# Patient Record
Sex: FEMALE | Race: WHITE | Employment: FULL TIME | URBAN - METROPOLITAN AREA
[De-identification: names, ages, dates, MRNs, and addresses within clinical notes are randomized per-mention and may not be internally consistent; named-entity substitution may affect disease eponyms.]

---

## 2019-10-15 ENCOUNTER — OFFICE VISIT (OUTPATIENT)
Dept: ENDOCRINOLOGY | Age: 49
End: 2019-10-15
Payer: COMMERCIAL

## 2019-10-15 VITALS
HEART RATE: 75 BPM | WEIGHT: 292.2 LBS | DIASTOLIC BLOOD PRESSURE: 84 MMHG | RESPIRATION RATE: 16 BRPM | HEIGHT: 62 IN | BODY MASS INDEX: 53.77 KG/M2 | SYSTOLIC BLOOD PRESSURE: 132 MMHG | OXYGEN SATURATION: 98 %

## 2019-10-15 DIAGNOSIS — E11.9 TYPE 2 DIABETES MELLITUS WITHOUT COMPLICATION, UNSPECIFIED WHETHER LONG TERM INSULIN USE (HCC): Primary | ICD-10-CM

## 2019-10-15 DIAGNOSIS — E55.9 VITAMIN D DEFICIENCY: ICD-10-CM

## 2019-10-15 LAB — HBA1C MFR BLD: 9.3 %

## 2019-10-15 PROCEDURE — 83036 HEMOGLOBIN GLYCOSYLATED A1C: CPT | Performed by: INTERNAL MEDICINE

## 2019-10-15 PROCEDURE — 99204 OFFICE O/P NEW MOD 45 MIN: CPT | Performed by: INTERNAL MEDICINE

## 2019-10-15 RX ORDER — MELOXICAM 15 MG/1
15 TABLET ORAL DAILY
COMMUNITY

## 2019-10-15 RX ORDER — CITALOPRAM 20 MG/1
20 TABLET ORAL DAILY
COMMUNITY

## 2019-10-15 RX ORDER — GLIPIZIDE 10 MG/1
10 TABLET ORAL
COMMUNITY
End: 2020-01-23

## 2019-10-15 RX ORDER — ROPINIROLE 0.5 MG/1
0.5 TABLET, FILM COATED ORAL 3 TIMES DAILY
COMMUNITY

## 2019-10-22 ENCOUNTER — TELEPHONE (OUTPATIENT)
Dept: ENDOCRINOLOGY | Age: 49
End: 2019-10-22

## 2019-10-22 DIAGNOSIS — E11.9 TYPE 2 DIABETES MELLITUS WITHOUT COMPLICATION, UNSPECIFIED WHETHER LONG TERM INSULIN USE (HCC): Primary | ICD-10-CM

## 2019-10-23 DIAGNOSIS — E11.9 TYPE 2 DIABETES MELLITUS WITHOUT COMPLICATION, UNSPECIFIED WHETHER LONG TERM INSULIN USE (HCC): ICD-10-CM

## 2019-10-31 DIAGNOSIS — E11.9 TYPE 2 DIABETES MELLITUS WITHOUT COMPLICATION, UNSPECIFIED WHETHER LONG TERM INSULIN USE (HCC): ICD-10-CM

## 2019-11-11 ENCOUNTER — TELEPHONE (OUTPATIENT)
Dept: ENDOCRINOLOGY | Age: 49
End: 2019-11-11

## 2019-11-11 RX ORDER — INSULIN LISPRO 100 [IU]/ML
INJECTION, SOLUTION INTRAVENOUS; SUBCUTANEOUS
Qty: 10 PEN | Refills: 5 | Status: SHIPPED | OUTPATIENT
Start: 2019-11-11 | End: 2019-12-09 | Stop reason: SDUPTHER

## 2019-12-09 RX ORDER — INSULIN LISPRO 100 [IU]/ML
INJECTION, SOLUTION INTRAVENOUS; SUBCUTANEOUS
Qty: 15 PEN | Refills: 3 | Status: SHIPPED | OUTPATIENT
Start: 2019-12-09 | End: 2020-01-23 | Stop reason: SDUPTHER

## 2019-12-21 LAB
CREATININE, URINE: 111
MICROALBUMIN/CREAT 24H UR: 12.2 MG/G{CREAT}
MICROALBUMIN/CREAT UR-RTO: 11

## 2019-12-23 LAB
AVERAGE GLUCOSE: NORMAL
BUN BLDV-MCNC: NORMAL MG/DL
CALCIUM SERPL-MCNC: 9 MG/DL
CHLORIDE BLD-SCNC: NORMAL MMOL/L
CO2: NORMAL
CREAT SERPL-MCNC: 0.59 MG/DL
GFR CALCULATED: 106
GLUCOSE BLD-MCNC: NORMAL MG/DL
HBA1C MFR BLD: 8.1 %
POTASSIUM SERPL-SCNC: 4.3 MMOL/L
SODIUM BLD-SCNC: 138 MMOL/L
VITAMIN D 25-HYDROXY: 30.3
VITAMIN D2, 25 HYDROXY: NORMAL
VITAMIN D3,25 HYDROXY: NORMAL

## 2019-12-27 DIAGNOSIS — E55.9 VITAMIN D DEFICIENCY: ICD-10-CM

## 2019-12-27 DIAGNOSIS — E11.9 TYPE 2 DIABETES MELLITUS WITHOUT COMPLICATION, UNSPECIFIED WHETHER LONG TERM INSULIN USE (HCC): ICD-10-CM

## 2020-01-23 ENCOUNTER — OFFICE VISIT (OUTPATIENT)
Dept: ENDOCRINOLOGY | Age: 50
End: 2020-01-23
Payer: COMMERCIAL

## 2020-01-23 VITALS
DIASTOLIC BLOOD PRESSURE: 82 MMHG | RESPIRATION RATE: 16 BRPM | OXYGEN SATURATION: 95 % | WEIGHT: 293 LBS | HEIGHT: 62 IN | BODY MASS INDEX: 53.92 KG/M2 | HEART RATE: 84 BPM | SYSTOLIC BLOOD PRESSURE: 134 MMHG

## 2020-01-23 PROCEDURE — 99214 OFFICE O/P EST MOD 30 MIN: CPT | Performed by: INTERNAL MEDICINE

## 2020-01-23 RX ORDER — INSULIN LISPRO 100 [IU]/ML
INJECTION, SOLUTION INTRAVENOUS; SUBCUTANEOUS
Qty: 15 PEN | Refills: 3 | Status: SHIPPED
Start: 2020-01-23 | End: 2020-05-28 | Stop reason: SDUPTHER

## 2020-01-23 NOTE — PROGRESS NOTES
name: Not on file    Number of children: Not on file    Years of education: Not on file    Highest education level: Not on file   Occupational History    Not on file   Social Needs    Financial resource strain: Not on file    Food insecurity:     Worry: Not on file     Inability: Not on file    Transportation needs:     Medical: Not on file     Non-medical: Not on file   Tobacco Use    Smoking status: Former Smoker    Smokeless tobacco: Never Used   Substance and Sexual Activity    Alcohol use: Not on file    Drug use: Not on file    Sexual activity: Not on file   Lifestyle    Physical activity:     Days per week: Not on file     Minutes per session: Not on file    Stress: Not on file   Relationships    Social connections:     Talks on phone: Not on file     Gets together: Not on file     Attends Jain service: Not on file     Active member of club or organization: Not on file     Attends meetings of clubs or organizations: Not on file     Relationship status: Not on file    Intimate partner violence:     Fear of current or ex partner: Not on file     Emotionally abused: Not on file     Physically abused: Not on file     Forced sexual activity: Not on file   Other Topics Concern    Not on file   Social History Narrative    Not on file     FAMILY HISTORY   Family History   Problem Relation Age of Onset    Diabetes Neg Hx      ALLERGIES AND DRUG REACTIONS   Allergies   Allergen Reactions    Demerol Hcl [Meperidine]        CURRENT MEDICATIONS   Current Outpatient Medications   Medication Sig Dispense Refill    insulin lispro, 1 Unit Dial, (HUMALOG KWIKPEN) 100 UNIT/ML SOPN Injection 10 units before meals + sliding scale .  MAX 50 units daily 15 pen 3    Insulin Glargine, 2 Unit Dial, (TOUJEO MAX SOLOSTAR) 300 UNIT/ML SOPN Inject 38 Units into the skin nightly 6 pen 3    Insulin Pen Needle (BD PEN NEEDLE MICRO U/F) 32G X 6 MM MISC Uses with insulin 4 times a day 250 each 5    Secukinumab (COSENTYX, 300 MG DOSE, SC) Inject into the skin once a week      meloxicam (MOBIC) 15 MG tablet Take 15 mg by mouth daily      citalopram (CELEXA) 20 MG tablet Take 20 mg by mouth daily      rOPINIRole (REQUIP) 0.5 MG tablet Take 0.5 mg by mouth 3 times daily      vitamin D (CHOLECALCIFEROL) 1000 UNIT TABS tablet Take 1,000 Units by mouth daily      IRON PO Take 45 mg by mouth daily       No current facility-administered medications for this visit. Review of Systems  Constitutional: No fever, no chills, no diaphoresis, no generalized weakness. HEENT: No blurred vision, No sore throat, no ear pain, no hair loss  Neck: denied any neck swelling, difficulty swallowing,   Cardio-pulmonary: No CP, SOB or palpitation, No orthopnea or PND. No cough or wheezing. GI: No N/V/D, no constipation, No abdominal pain, no melena or hematochezia   : Denied any dysuria, hematuria, flank pain, discharge, or incontinence. Skin: denied any rash, ulcer, Hirsute, or hyperpigmentation. MSK: denied any joint deformity, joint pain/swelling, muscle pain, or back pain. Neuro: no numbness, no tingling, no weakness, _    OBJECTIVE    /82 (Site: Left Upper Arm, Position: Sitting, Cuff Size: Medium Adult)   Pulse 84   Resp 16   Ht 5' 2\" (1.575 m)   Wt (!) 300 lb 9.6 oz (136.4 kg)   SpO2 95%   BMI 54.98 kg/m²   BP Readings from Last 4 Encounters:   01/23/20 134/82   10/15/19 132/84     Wt Readings from Last 6 Encounters:   01/23/20 (!) 300 lb 9.6 oz (136.4 kg)   10/15/19 292 lb 3.2 oz (132.5 kg)       Physical examination:  General: awake alert, oriented x3, no abnormal position or movements. Morbidly Obese   HEENT: normocephalic non-traumatic, no exophthalmos   Neck: supple, no LN enlargement, no thyromegaly, no thyroid tenderness, no JVD. Pulm: Clear equal air entry no added sounds, no wheezing or rhonchi    CVS: S1 + S2, no murmur, no heave.  Dorsalis pedis pulse palpable   Abd: soft lax, no tenderness, no participate in the care of this patient. Please do not hesitate to contact us with any additional questions. Diagnosis Orders   1. IDDM (insulin dependent diabetes mellitus) ([facility])  insulin lispro, 1 Unit Dial, (HUMALOG KWIKPEN) 100 UNIT/ML SOPN    Insulin Pen Needle (BD PEN NEEDLE MICRO U/F) 32G X 6 MM MISC   2.  Type 2 diabetes mellitus without complication, unspecified whether long term insulin use (HCC)  Insulin Glargine, 2 Unit Dial, (TOUJEO MAX SOLOSTAR) 300 UNIT/ML CHAIM Hearn MD  Endocrinologist, [facility] and Endocrinology   [address] [facility], [address] [place] [address]   Phone: [phone]  Fax: [phone]  --------------------------------------------  Electronically signed by Josse Bennett MD

## 2020-01-23 NOTE — LETTER
[address] [place] [address] Department of Endocrinology Diabetes and Metabolism   [address] [place] [address]   Phone: [phone]  Fax: [phone]      Provider: Mellisa Macdonald MD  Primary Care Physician: Arnaud Quintanilla MD   Referring Provider: No ref. provider found    Patient: Cari Prabhakar  YOB: 1970  Date of Visit: 1/23/2020      Dear Dr. Arnaud Quintanilla MD   I had the pleasure of seeing your patient Cari Prabhakar today at endocrine clinic for follow up visit and I enclosed a copy of the office visit completed today. Thank you very much for asking us to participate in the care of this very pleasant patient. Please don't hesitate to call if there are any further questions or concerns. Sincerely   Mellisa Macdonald MD  Endocrinologist, [address] [address]   Phone: [phone]  Fax: [phone]      [address] [place] [address] Department of Endocrinology Diabetes and Metabolism   [address] [place] [address]   Phone: [phone]  Fax: [phone]    Date of Service: 1/23/2020   n  Primary Care Physician: Arnaud Quintanilla MD  Provider: Mellisa Macdonald MD     Reason for the visit:  DM type 2     History of Present Illness: The history is provided by the patient. No  was used. Accuracy of the patient data is excellent. Cari Prabhakar is a very pleasant 44 y.o. female seen today for  Follow up visit     Cari Prabhakar was diagnosed with diabetes at age 37 and she is currently on Toujeo 38 units daily, Humalog 10 units with meals + ss 1:50>150, Glipizide 10 mg BID  The patient has been checking blood sugar 5-8 time a days using freestyle heleen .  I reviewed point-of-care blood glucose levels   Most recent A1c results summarized below  Lab Results   Component Value Date    LABA1C 8.1 12/23/2019    LABA1C 9.3 10/15/2019     Patient has had no hypoglycemic episodes  Intimate partner violence:     Fear of current or ex partner: Not on file     Emotionally abused: Not on file     Physically abused: Not on file     Forced sexual activity: Not on file   Other Topics Concern    Not on file   Social History Narrative    Not on file     FAMILY HISTORY   Family History   Problem Relation Age of Onset    Diabetes Neg Hx      ALLERGIES AND DRUG REACTIONS   Allergies   Allergen Reactions    Demerol Hcl [Meperidine]        CURRENT MEDICATIONS   Current Outpatient Medications   Medication Sig Dispense Refill    insulin lispro, 1 Unit Dial, (HUMALOG KWIKPEN) 100 UNIT/ML SOPN Injection 10 units before meals + sliding scale . MAX 50 units daily 15 pen 3    Insulin Glargine, 2 Unit Dial, (TOUJEO MAX SOLOSTAR) 300 UNIT/ML SOPN Inject 38 Units into the skin nightly 6 pen 3    Insulin Pen Needle (BD PEN NEEDLE MICRO U/F) 32G X 6 MM MISC Uses with insulin 4 times a day 250 each 5    Secukinumab (COSENTYX, 300 MG DOSE, SC) Inject into the skin once a week      meloxicam (MOBIC) 15 MG tablet Take 15 mg by mouth daily      citalopram (CELEXA) 20 MG tablet Take 20 mg by mouth daily      rOPINIRole (REQUIP) 0.5 MG tablet Take 0.5 mg by mouth 3 times daily      vitamin D (CHOLECALCIFEROL) 1000 UNIT TABS tablet Take 1,000 Units by mouth daily      IRON PO Take 45 mg by mouth daily       No current facility-administered medications for this visit. Review of Systems  Constitutional: No fever, no chills, no diaphoresis, no generalized weakness. HEENT: No blurred vision, No sore throat, no ear pain, no hair loss  Neck: denied any neck swelling, difficulty swallowing,   Cardio-pulmonary: No CP, SOB or palpitation, No orthopnea or PND. No cough or wheezing. GI: No N/V/D, no constipation, No abdominal pain, no melena or hematochezia   : Denied any dysuria, hematuria, flank pain, discharge, or incontinence. Skin: denied any rash, ulcer, Hirsute, or hyperpigmentation. MSK: denied any joint deformity, joint pain/swelling, muscle pain, or back pain. Neuro: no numbness, no tingling, no weakness, _    OBJECTIVE    /82 (Site: Left Upper Arm, Position: Sitting, Cuff Size: Medium Adult)   Pulse 84   Resp 16   Ht 5' 2\" (1.575 m)   Wt (!) 300 lb 9.6 oz (136.4 kg)   SpO2 95%   BMI 54.98 kg/m²    BP Readings from Last 4 Encounters:   01/23/20 134/82   10/15/19 132/84     Wt Readings from Last 6 Encounters:   01/23/20 (!) 300 lb 9.6 oz (136.4 kg)   10/15/19 292 lb 3.2 oz (132.5 kg)       Physical examination:  General: awake alert, oriented x3, no abnormal position or movements. Morbidly Obese   HEENT: normocephalic non-traumatic, no exophthalmos   Neck: supple, no LN enlargement, no thyromegaly, no thyroid tenderness, no JVD. Pulm: Clear equal air entry no added sounds, no wheezing or rhonchi    CVS: S1 + S2, no murmur, no heave. Dorsalis pedis pulse palpable   Abd: soft lax, no tenderness, no organomegaly, audible bowel sounds. Skin: warm, no lesions, no rash.  No callus, no Ulcers, No acanthosis nigricans  Musculoskeletal: No back tenderness, no kyphosis/scoliosis    Neuro: CN intact, Monofilament sensation decreased bilateral , muscle power normal  Psych: normal mood, and affect      Review of Laboratory Data:  I personally reviewed the following lab:  COMP METABOLIC PANEL (90/93/8486 12:02 PM EST)  Component Value   Glucose 182 (H)   Urea Nitrogen 10   Creatinine 0.59   BUN/CREATININE RATIO 17   Total Bilirubin 0.7   Total Protein 7.9   Albumin 3.3 (L)   A/G RATIO 0.7   Calcium(Ca) 9.0   Alkaline Phosphatase 167 (H)   Aspartate Aminot.(AST) 25   Alanine Aminotrans(ALT) 35   Sodium(Na) 138   Potassium(K) 4.3   Chloride(Cl) 107   Carbon Dioxide(CO2) 27.0   ANION GAP 8.3   CALCULATED OSMOLALITY 290   EGFR NON  106     CBC AND DIFF W/ PLATELETS (59/09/6576 8:45 AM EDT)  Component Value   WBC 4.4 (L)   RBC 4.18   Hgb 11.9 (L)   Hematocrit(HCT) 34.9 (L) · Discussed with patient A1c and blood sugar goals   · Optimal blood sugars: 100-140 pre-prandial, < 180 peak post-prandial  · The patient counseled about the complications of uncontrolled diabetes   · Patient was counselled about the importance of self-blood glucose monitoring and eating consistent carb diet to avoid blood sugar fluctuations   · Patient up to date with the routine diabetes maintenance and prevention  · Diabetes labs before next visit     Obesity  ? Discussed lifestyle changes including diet and exercise with patient in depth. Also discussed with patient cardiovascular risk associated with obesity  ? Started on GLP-1 agonist     Liver cirrhosis   · Pts with liver cirrhosis are at higher risk of hypoglycemia   · Will stop Glipizide     Return in about 4 months (around 5/23/2020) for DM type 2 on insulin . The above issues were reviewed with the patient who understood and agreed with the plan. Thank you for allowing us to participate in the care of this patient. Please do not hesitate to contact us with any additional questions. Diagnosis Orders   1. IDDM (insulin dependent diabetes mellitus) (HCC)  insulin lispro, 1 Unit Dial, (HUMALOG KWIKPEN) 100 UNIT/ML SOPN    Insulin Pen Needle (BD PEN NEEDLE MICRO U/F) 32G X 6 MM MISC   2.  Type 2 diabetes mellitus without complication, unspecified whether long term insulin use (HCC)  Insulin Glargine, 2 Unit Dial, (TOUJEO MAX SOLOSTAR) 300 UNIT/ML CHAIM Barahona MD  Endocrinologist, [facility] Diabetes Care and Endocrinology   [address] [facility] [address]   Phone: [phone]  Fax: [phone]  --------------------------------------------  Electronically signed by Solitario Babb MD

## 2020-05-28 ENCOUNTER — VIRTUAL VISIT (OUTPATIENT)
Dept: ENDOCRINOLOGY | Age: 50
End: 2020-05-28
Payer: COMMERCIAL

## 2020-05-28 PROBLEM — E55.9 VITAMIN D DEFICIENCY: Status: ACTIVE | Noted: 2020-05-28

## 2020-05-28 PROCEDURE — 99214 OFFICE O/P EST MOD 30 MIN: CPT | Performed by: INTERNAL MEDICINE

## 2020-05-28 RX ORDER — INSULIN LISPRO 100 [IU]/ML
INJECTION, SOLUTION INTRAVENOUS; SUBCUTANEOUS
Qty: 25 PEN | Refills: 3 | Status: SHIPPED
Start: 2020-05-28 | End: 2020-08-25 | Stop reason: SDUPTHER

## 2020-05-28 RX ORDER — INSULIN GLARGINE 300 U/ML
45 INJECTION, SOLUTION SUBCUTANEOUS NIGHTLY
Qty: 10 PEN | Refills: 3 | Status: SHIPPED
Start: 2020-05-28 | End: 2020-08-25 | Stop reason: SDUPTHER

## 2020-05-28 RX ORDER — SEMAGLUTIDE 1.34 MG/ML
0.5 INJECTION, SOLUTION SUBCUTANEOUS
Qty: 12 PEN | Refills: 3 | Status: SHIPPED
Start: 2020-05-28 | End: 2021-06-01

## 2020-05-28 NOTE — PROGRESS NOTES
[address] [place] [address] Department of Endocrinology Diabetes and Metabolism   [address] [facility] [address]   Phone: [phone]  Fax: [phone]    Date of Service: 5/28/2020   n  Primary Care Physician: Sunday Bill MD  Provider: Maria Eugenia Nielsen MD     Reason for the visit:  DM type 2     History of Present Illness: The history is provided by the patient. No  was used. Accuracy of the patient data is excellent. Ravin Diaz is a very pleasant 44 y.o. female with past medical history of liver cirrhosis seen today for follow up visit     Ravin Diaz was diagnosed with diabetes at age 37 and she is currently on Toujeo 38 units daily at bedtime, Humalog 10 units with meals + ss 1:50>150, Ozempic 0.5 mg weekly  The patient has been checking blood sugar 5-8 time a days using freestyle helene and most readings at high   Most recent A1c results summarized below  Lab Results   Component Value Date    LABA1C 8.1 12/23/2019    LABA1C 9.3 10/15/2019     Patient has had no hypoglycemic episodes   The patient has been mindful of what has been eating and following diabetic diet as encouraged  I reviewed current medications and the patient has no issues with diabetes medications  Ravin Diaz is up to date with eye exam and denied any history of diabetic retinopathy   The patient seeing podiatrist every few weeks and also performs  own feet care  Microvascular complications:  No Retinopathy, Nephropathy or Neuropathy   Macrovascular complications: no CAD, PVD, or Stroke  The patient receives Flushot every year and up to date with the Pneumonia vaccine     PAST MEDICAL HISTORY   Past Medical History:   Diagnosis Date    DM type 2 (diabetes mellitus, type 2) ([facility]     Liver cirrhosis ([facility]     Morbid obesity ([facility]     Psoriasis     Vitamin D deficiency      PAST SURGICAL HISTORY   No past surgical history on file. SOCIAL HISTORY   Tobacco:   reports that she has quit smoking.  She joint deformity, joint pain/swelling, muscle pain, or back pain. Neuro: no numbness, no tingling, no weakness, _    OBJECTIVE    There were no vitals taken for this visit. BP Readings from Last 4 Encounters:   01/23/20 134/82   10/15/19 132/84     Wt Readings from Last 6 Encounters:   01/23/20 (!) 300 lb 9.6 oz (136.4 kg)   10/15/19 292 lb 3.2 oz (132.5 kg)     Physical examination:  Due to this being a TeleHealth encounter, evaluation of the following organ systems is limited: Vitals/Constitutional/EENT/Resp/CV/GI//MS/Neuro/Skin/Heme-Lymph-Imm. Modified physical exam through Telemedicine camera    General: Communicating well via camera   Neck: no obvious neck mass. No obvious neck deformity     CVS: no distress   Chest: no distress. Chest is moving with respiration    Extremities:  no visible tremor  Skin: No visible rashes as seen from camera   Musculoskeletal: no visible deformity  Neuro: Alert and oriented to person, place, and time. Psychiatric: Normal mood and affect.  Behavior is normal      Review of Laboratory Data:  I personally reviewed the following lab:  COMP METABOLIC PANEL (71/94/3423 12:02 PM EST)  Component Value   Glucose 182 (H)   Urea Nitrogen 10   Creatinine 0.59   BUN/CREATININE RATIO 17   Total Bilirubin 0.7   Total Protein 7.9   Albumin 3.3 (L)   A/G RATIO 0.7   Calcium(Ca) 9.0   Alkaline Phosphatase 167 (H)   Aspartate Aminot.(AST) 25   Alanine Aminotrans(ALT) 35   Sodium(Na) 138   Potassium(K) 4.3   Chloride(Cl) 107   Carbon Dioxide(CO2) 27.0   ANION GAP 8.3   CALCULATED OSMOLALITY 290   EGFR NON  106     CBC AND DIFF W/ PLATELETS (94/67/4850 8:45 AM EDT)  Component Value   WBC 4.4 (L)   RBC 4.18   Hgb 11.9 (L)   Hematocrit(HCT) 34.9 (L)   MCV 83.4   MCH 28.4 (L)   MCHC 34.1   RDW 17.2 (H)   Mean Platelet Volume 23.7 (H)   Platelets 64 (L)   Diff Type Automated Differential   Neutrophils 74.4   Lymphocytes 16.1   Monocytes 7.5   Eosinophils 1.7   Basophils 0.3 ABS Neutrophils 3.3   ABS Lymphocytes 0.7 (L)   ABS Monocytes 0.3   ABS Eosinophils 0.1   ABS Basophils 0.0       PROTHROMBIN TIME/INR (09/20/2019 8:45 AM EDT)  Component Value   Prothrombin Time 14.3   INR 1.1 (L)     No results found for: WBC, RBC, HGB, HCT, MCV, MCH, MCHC, RDW, PLT, MPV, GRANULOCYTES, BANDS   Lab Results   Component Value Date/Time     12/21/2019    K 4.3 12/21/2019    CREATININE 0.59 12/21/2019    CALCIUM 9.0 12/21/2019    LABGLOM 106 12/21/2019      No results found for: TSH, T4FREE, Q3KDSGX, FT3, S6UJIZQ, TSI, TPOABS, THGAB  Lab Results   Component Value Date    LABA1C 8.1 12/23/2019    MALBCR 11.0 12/21/2019    LABCREA 111.00 12/21/2019     Lab Results   Component Value Date    LABA1C 8.1 12/23/2019    LABA1C 9.3 10/15/2019     No results found for: CHOL, TRIG, HDL  Lab Results   Component Value Date    VITD25 30.3 12/23/2019       Medical Records/Labs/Images review:   I personally reviewed and summarized previous records   All labs and imaging studies were independently reviewed     ASSESSMENT & RECOMMENDATIONS   Ren Rehman, a 44 y.o.-old female seen in for the following issues     Diabetes Mellitus Type 2     · Worsening control due to diet non-compliance   · Pt has liver cirrhosis and she is at higher risk of hypoglycemia. Off Glipizide   · Change DM regimen to: Toujeo 45 units daily at bedtime, Humalog 15 units with meals + ss 3:50>150, Ozempic 0.5 mg weekly  · I discussed side effect profile of GLP-1 agonists with patient  · The patient was advised to checking BS using freestyle Emily scanner   · Optimal blood sugars: 100-140 pre-prandial, < 180 peak post-prandial  · The patient counseled about the complications of uncontrolled diabetes   · Patient up to date with the routine diabetes maintenance and prevention  · Diabetes labs before next visit     Obesity   Discussed lifestyle changes including diet and exercise with patient in depth.  Also discussed with patient cardiovascular

## 2020-05-28 NOTE — PROGRESS NOTES
Chino Christopher was read the following message We want to confirm that, for purposes of billing, this is a virtual visit with your provider for which we will submit a claim for reimbursement with your insurance company. You will be responsible for any copays, coinsurance amounts or other amounts not covered by your insurance company. If you do not accept this, unfortunately we will not be able to schedule a virtual visit with the provider. Do you accept?  Justyna Clay responded YES

## 2020-08-25 ENCOUNTER — VIRTUAL VISIT (OUTPATIENT)
Dept: ENDOCRINOLOGY | Age: 50
End: 2020-08-25
Payer: COMMERCIAL

## 2020-08-25 PROCEDURE — 99214 OFFICE O/P EST MOD 30 MIN: CPT | Performed by: INTERNAL MEDICINE

## 2020-08-25 RX ORDER — INSULIN GLARGINE 300 U/ML
55 INJECTION, SOLUTION SUBCUTANEOUS NIGHTLY
Qty: 10 PEN | Refills: 5 | Status: SHIPPED
Start: 2020-08-25 | End: 2021-12-10

## 2020-08-25 RX ORDER — INSULIN LISPRO 100 [IU]/ML
INJECTION, SOLUTION INTRAVENOUS; SUBCUTANEOUS
Qty: 25 PEN | Refills: 3 | Status: SHIPPED
Start: 2020-08-25 | End: 2021-06-07

## 2020-08-25 NOTE — PROGRESS NOTES
[address] [place] [address] Department of Endocrinology Diabetes and Metabolism   [address] [facility] [address]   Phone: [phone]  Fax: [phone]    Date of Service: 8/25/2020   n  Primary Care Physician: Babs Dill MD  Provider: Michelle Frazier MD     Reason for the visit:  DM type 2     History of Present Illness: The history is provided by the patient. No  was used. Accuracy of the patient data is excellent. Too Wiggnis is a very pleasant 44 y.o. female with past medical history of liver cirrhosis seen today for follow up visit     Too Wiggins was diagnosed with diabetes at age 37 and she is currently on Toujeo 45 units daily at bedtime, Humalog 18 units with meals + ss 3:50>150, Ozempic 0.5 mg weekly  The patient has been checking blood sugar 5-8 time a days using freestyle helene and most readings at high   Most recent A1c results summarized below  Lab Results   Component Value Date    LABA1C 8.1 12/23/2019    LABA1C 9.3 10/15/2019     Patient has had no hypoglycemic episodes   The patient has been mindful of what has been eating and following diabetic diet as encouraged  I reviewed current medications and the patient has no issues with diabetes medications  Too Wiggins is up to date with eye exam and denied any history of diabetic retinopathy   The patient seeing podiatrist every few weeks and also performs  own feet care  Microvascular complications:  No Retinopathy, Nephropathy or Neuropathy   Macrovascular complications: no CAD, PVD, or Stroke  The patient receives Flushot every year and up to date with the Pneumonia vaccine     PAST MEDICAL HISTORY   Past Medical History:   Diagnosis Date    DM type 2 (diabetes mellitus, type 2) ([address]     [facility]     Morbid obesity ([facility]     Psoriasis     Vitamin D deficiency      PAST SURGICAL HISTORY   No past surgical history on file. SOCIAL HISTORY   Tobacco:   reports that she has quit smoking.  She has never used smokeless tobacco.  Alcol:   has no history on file for alcohol. Illicit Drugs:   has no history on file for drug. FAMILY HISTORY   Family History   Problem Relation Age of Onset    Diabetes Neg Hx      ALLERGIES AND DRUG REACTIONS   Allergies   Allergen Reactions    Demerol Hcl [Meperidine]        CURRENT MEDICATIONS   Current Outpatient Medications   Medication Sig Dispense Refill    methotrexate (RHEUMATREX) 2.5 MG chemo tablet Take by mouth once a week 5 tablets every friday      insulin lispro, 1 Unit Dial, (HUMALOG KWIKPEN) 100 UNIT/ML SOPN Injection 25 units before meals + sliding scale .  units daily 25 pen 3    Insulin Glargine, 2 Unit Dial, (TOUJEO MAX SOLOSTAR) 300 UNIT/ML SOPN Inject 55 Units into the skin nightly 10 pen 5    Semaglutide,0.25 or 0.5MG/DOS, (OZEMPIC, 0.25 OR 0.5 MG/DOSE,) 2 MG/1.5ML SOPN Inject 0.5 mg into the skin every 7 days 12 pen 3    Insulin Pen Needle (BD PEN NEEDLE MICRO U/F) 32G X 6 MM MISC Uses with insulin 4 times a day 250 each 5    Secukinumab (COSENTYX, 300 MG DOSE, SC) Inject into the skin every 30 days       meloxicam (MOBIC) 15 MG tablet Take 15 mg by mouth daily      citalopram (CELEXA) 20 MG tablet Take 20 mg by mouth daily      rOPINIRole (REQUIP) 0.5 MG tablet Take 0.5 mg by mouth 3 times daily      vitamin D (CHOLECALCIFEROL) 1000 UNIT TABS tablet Take 1,000 Units by mouth daily      IRON PO Take 45 mg by mouth daily       No current facility-administered medications for this visit. Review of Systems  Constitutional: No fever, no chills, no diaphoresis, no generalized weakness. HEENT: No blurred vision, No sore throat, no ear pain, no hair loss  Neck: denied any neck swelling, difficulty swallowing,   Cardio-pulmonary: No CP, SOB or palpitation, No orthopnea or PND. No cough or wheezing.   GI: No N/V/D, no constipation, No abdominal pain, no melena or hematochezia   : Denied any dysuria, hematuria, flank pain, Automated Differential   Neutrophils 74.4   Lymphocytes 16.1   Monocytes 7.5   Eosinophils 1.7   Basophils 0.3   ABS Neutrophils 3.3   ABS Lymphocytes 0.7 (L)   ABS Monocytes 0.3   ABS Eosinophils 0.1   ABS Basophils 0.0       PROTHROMBIN TIME/INR (09/20/2019 8:45 AM EDT)  Component Value   Prothrombin Time 14.3   INR 1.1 (L)     No results found for: WBC, RBC, HGB, HCT, MCV, MCH, MCHC, RDW, PLT, MPV, GRANULOCYTES, BANDS   Lab Results   Component Value Date/Time     12/21/2019    K 4.3 12/21/2019    CREATININE 0.59 12/21/2019    CALCIUM 9.0 12/21/2019    LABGLOM 106 12/21/2019      No results found for: TSH, T4FREE, C4RSGTB, FT3, K6YFSSX, TSI, TPOABS, THGAB  Lab Results   Component Value Date    LABA1C 8.1 12/23/2019    MALBCR 11.0 12/21/2019    LABCREA 111.00 12/21/2019     Lab Results   Component Value Date    LABA1C 8.1 12/23/2019    LABA1C 9.3 10/15/2019     No results found for: CHOL, TRIG, HDL  Lab Results   Component Value Date    VITD25 30.3 12/23/2019       Medical Records/Labs/Images review:   I personally reviewed and summarized previous records   All labs and imaging studies were independently reviewed     ASSESSMENT & RECOMMENDATIONS   Dion Mcgowan, a 44 y.o.-old female seen in for the following issues     Diabetes Mellitus Type 2     · Uncontrolled   · Pt has liver cirrhosis and she is at higher risk of hypoglycemia. Off Glipizide   · Change DM regimen to:  Toujeo 55 units daily at bedtime, Humalog 25 units with meals + ss 3:50>150, Ozempic 0.5 mg weekly  · I discussed side effect profile of GLP-1 agonists with patient  · The patient was advised to checking BS using freestyle Emily scanner   · Optimal blood sugars: 100-140 pre-prandial, < 180 peak post-prandial  · The patient counseled about the complications of uncontrolled diabetes   · Patient up to date with the routine diabetes maintenance and prevention  · Diabetes labs before next visit     Obesity   Discussed lifestyle changes including diet and exercise with patient in depth. Also discussed with patient cardiovascular risk associated with obesity   on GLP-1 agonist     Liver cirrhosis   · Pts with liver cirrhosis are at higher risk of hypoglycemia   · Continue monitoring BG with freestyle Emily scanner     Return in about 10 weeks (around 11/3/2020) for IDDM . The above issues were reviewed with the patient who understood and agreed with the plan. Thank you for allowing us to participate in the care of this patient. Please do not hesitate to contact us with any additional questions. Diagnosis Orders   1. IDDM (insulin dependent diabetes mellitus) (HCC)  insulin lispro, 1 Unit Dial, (HUMALOG KWIKPEN) 100 UNIT/ML SOPN    Hemoglobin A1C   2. Type 2 diabetes mellitus without complication, unspecified whether long term insulin use (HCC)  Insulin Glargine, 2 Unit Dial, (TOUJEO MAX SOLOSTAR) 300 UNIT/ML MARIA EN     Radha Gonzalez MD  Endocrinologist, [facility] Diabetes Care and Endocrinology   [address] [facility] [address]   Phone: [phone]  Fax: [phone]  --------------------------------------------  An electronic signature was used to authenticate this note. Gaile Litten, MD on 8/25/2020 at 8:53 PM  Services were provided through a video synchronous discussion virtually to substitute for in-person clinic visit. Pursuant to the emergency declaration under the [facility], [facility] waiver authority and the [employer] and [employer] Act, this Virtual  Visit was conducted, with patient's consent, to reduce the patient's risk of exposure to COVID-19 and provide continuity of care.

## 2020-11-03 ENCOUNTER — VIRTUAL VISIT (OUTPATIENT)
Dept: ENDOCRINOLOGY | Age: 50
End: 2020-11-03
Payer: COMMERCIAL

## 2020-11-03 PROCEDURE — 99214 OFFICE O/P EST MOD 30 MIN: CPT | Performed by: INTERNAL MEDICINE

## 2020-11-03 RX ORDER — PREDNISONE 1 MG/1
TABLET ORAL
COMMUNITY
Start: 2020-10-01

## 2020-11-03 NOTE — PROGRESS NOTES
Peewee Noel was read the following message We want to confirm that, for purposes of billing, this is a virtual visit with your provider for which we will submit a claim for reimbursement with your insurance company. You will be responsible for any copays, coinsurance amounts or other amounts not covered by your insurance company. If you do not accept this, unfortunately we will not be able to schedule a virtual visit with the provider. Do you accept?  Shonna Osuna responded YES

## 2020-11-03 NOTE — PROGRESS NOTES
[address] [place] [address] Department of Endocrinology Diabetes and Metabolism   [address] [facility] [address]   Phone: [phone]  Fax: [phone]    Date of Service: 11/3/2020   n  Primary Care Physician: Anastacia Conti MD  Provider: Rossy Sheets MD     Reason for the visit:  DM type 2     History of Present Illness: The history is provided by the patient. No  was used. Accuracy of the patient data is excellent. Shalini Seen is a very pleasant 44 y.o. female with past medical history of liver cirrhosis seen today for follow up visit     Shalini Seen was diagnosed with diabetes at age 37 and she is currently on Toujeo 55 units daily at bedtime, Humalog 25 units with meals + ss 3:50>150, Ozempic 0.5 mg weekly  The patient has been checking blood sugar 5-8 time a days using freestyle helene and most readings at high   Most recent A1c results summarized below  Lab Results   Component Value Date    LABA1C 8.1 12/23/2019    LABA1C 9.3 10/15/2019     Patient has had no hypoglycemic episodes   The patient has been mindful of what has been eating and following diabetic diet as encouraged  I reviewed current medications and the patient has no issues with diabetes medications  Shalini Seen is up to date with eye exam and denied any history of diabetic retinopathy   The patient seeing podiatrist every few weeks and also performs  own feet care  Microvascular complications:  No Retinopathy, Nephropathy or Neuropathy   Macrovascular complications: no CAD, PVD, or Stroke  The patient receives Flushot every year and up to date with the Pneumonia vaccine     PAST MEDICAL HISTORY   Past Medical History:   Diagnosis Date    DM type 2 (diabetes mellitus, type 2) ([facility]     Liver cirrhosis ([facility]     Morbid obesity ([facility]     Psoriasis     Vitamin D deficiency      PAST SURGICAL HISTORY   No past surgical history on file. SOCIAL HISTORY   Tobacco:   reports that she has quit smoking.  She has never used smokeless tobacco.  Alcol:   has no history on file for alcohol. Illicit Drugs:   has no history on file for drug. FAMILY HISTORY   Family History   Problem Relation Age of Onset    Diabetes Neg Hx      ALLERGIES AND DRUG REACTIONS   Allergies   Allergen Reactions    Demerol Hcl [Meperidine]        CURRENT MEDICATIONS   Current Outpatient Medications   Medication Sig Dispense Refill    predniSONE (DELTASONE) 5 MG tablet take 3 tablets by mouth once daily for 1 week then take 2 tablets. ..  (REFER TO PRESCRIPTION NOTES).  methotrexate (RHEUMATREX) 2.5 MG chemo tablet Take by mouth once a week 5 tablets every friday      insulin lispro, 1 Unit Dial, (HUMALOG KWIKPEN) 100 UNIT/ML SOPN Injection 25 units before meals + sliding scale .  units daily 25 pen 3    Insulin Glargine, 2 Unit Dial, (TOUJEO MAX SOLOSTAR) 300 UNIT/ML SOPN Inject 55 Units into the skin nightly 10 pen 5    Semaglutide,0.25 or 0.5MG/DOS, (OZEMPIC, 0.25 OR 0.5 MG/DOSE,) 2 MG/1.5ML SOPN Inject 0.5 mg into the skin every 7 days 12 pen 3    Insulin Pen Needle (BD PEN NEEDLE MICRO U/F) 32G X 6 MM MISC Uses with insulin 4 times a day 250 each 5    Secukinumab (COSENTYX, 300 MG DOSE, SC) Inject into the skin every 30 days       meloxicam (MOBIC) 15 MG tablet Take 15 mg by mouth daily      citalopram (CELEXA) 20 MG tablet Take 20 mg by mouth daily      rOPINIRole (REQUIP) 0.5 MG tablet Take 0.5 mg by mouth 3 times daily      vitamin D (CHOLECALCIFEROL) 1000 UNIT TABS tablet Take 1,000 Units by mouth daily      IRON PO Take 45 mg by mouth daily       No current facility-administered medications for this visit. Review of Systems  Constitutional: No fever, no chills, no diaphoresis, no generalized weakness. HEENT: No blurred vision, No sore throat, no ear pain, no hair loss  Neck: denied any neck swelling, difficulty swallowing,   Cardio-pulmonary: No CP, SOB or palpitation, No orthopnea or PND.  No cough or wheezing. GI: No N/V/D, no constipation, No abdominal pain, no melena or hematochezia   : Denied any dysuria, hematuria, flank pain, discharge, or incontinence. Skin: denied any rash, ulcer, Hirsute, or hyperpigmentation. MSK: denied any joint deformity, joint pain/swelling, muscle pain, or back pain. Neuro: no numbness, no tingling, no weakness, _    OBJECTIVE    There were no vitals taken for this visit. BP Readings from Last 4 Encounters:   01/23/20 134/82   10/15/19 132/84     Wt Readings from Last 6 Encounters:   01/23/20 (!) 300 lb 9.6 oz (136.4 kg)   10/15/19 292 lb 3.2 oz (132.5 kg)     Physical examination:  Due to this being a TeleHealth encounter, evaluation of the following organ systems is limited: Vitals/Constitutional/EENT/Resp/CV/GI//MS/Neuro/Skin/Heme-Lymph-Imm. Modified physical exam through Telemedicine camera    General: Communicating well via camera   Neck: no obvious neck mass. No obvious neck deformity     CVS: no distress   Chest: no distress. Chest is moving with respiration    Extremities:  no visible tremor  Skin: No visible rashes as seen from camera   Musculoskeletal: no visible deformity  Neuro: Alert and oriented to person, place, and time. Psychiatric: Normal mood and affect.  Behavior is normal      Review of Laboratory Data:  I personally reviewed the following lab:  Component Value   Glucose 295 (H)   Urea Nitrogen 14   Creatinine 0.74   BUN/CREATININE RATIO 19   Total Bilirubin 0.6   Total Protein 8.1   Albumin 3.1 (L)   A/G RATIO 0.6   Calcium(Ca) 9.1   Alkaline Phosphatase 165 (H)   Aspartate Aminot.(AST) 17   Alanine Aminotrans(ALT) 27   Sodium(Na) 137   Potassium(K) 3.8   Chloride(Cl) 104   Carbon Dioxide(CO2) 29.0   ANION GAP 7.8   CALCULATED OSMOLALITY 295     VITAMIN D 25-OH SCREEN FOR DEFICIENCY/TOXICITYResulted: 8/5/2020 6:41 PM  Component Name Value Ref Range   Vitamin D, 25-Hydroxy 32        No results found for: WBC, RBC, HGB, HCT, MCV, MCH, MCHC, RDW, PLT, MPV, GRANULOCYTES, BANDS   Lab Results   Component Value Date/Time     12/21/2019    K 4.3 12/21/2019    CREATININE 0.59 12/21/2019    CALCIUM 9.0 12/21/2019    LABGLOM 106 12/21/2019      No results found for: TSH, T4FREE, H5WDQAN, FT3, C9PCVHC, TSI, TPOABS, THGAB  Lab Results   Component Value Date    LABA1C 8.1 12/23/2019    MALBCR 11.0 12/21/2019    LABCREA 111.00 12/21/2019     Lab Results   Component Value Date    LABA1C 8.1 12/23/2019    LABA1C 9.3 10/15/2019     No results found for: CHOL, TRIG, HDL  Lab Results   Component Value Date    VITD25 30.3 12/23/2019       Medical Records/Labs/Images review:   I personally reviewed and summarized previous records   All labs and imaging studies were independently reviewed     ASSESSMENT & RECOMMENDATIONS   Ajith Carrion, a 44 y.o.-old female seen in for the following issues     Diabetes Mellitus Type 2     · worsening control due to current steroids use. Pt on steroids for Psoriasis (will like be on prednisone for a month)   · Pt has liver cirrhosis and she is at higher risk of hypoglycemia. Off Glipizide   · Change DM regimen to: Toujeo 55 units daily at bedtime, Humalog 30 units with meals + ss 3:50>150, Ozempic 0.5 mg weekly  · The patient was advised to checking BS using freestyle Emily scanner   · The patient counseled about the complications of uncontrolled diabetes     Obesity   on GLP-1 agonist     Liver cirrhosis   · Pts with liver cirrhosis are at higher risk of hypoglycemia   · Continue monitoring BG with freestyle Emily scanner     Return in about 4 months (around 3/3/2021) for DM type 2 . The above issues were reviewed with the patient who understood and agreed with the plan. Thank you for allowing us to participate in the care of this patient. Please do not hesitate to contact us with any additional questions. Diagnosis Orders   1. Vitamin D deficiency     2.  Type 2 diabetes mellitus without complication, unspecified whether long term insulin use ([facility]       Meryle Laurence MD  Endocrinologist, [facility] Diabetes Care and Endocrinology   [address] [facility] [address]   Phone: [phone]  Fax: [phone]  -----------------------------  An electronic signature was used to authenticate this note. Christina Finney MD on 11/3/2020 at 10:06 AM    This visit was performed as a virtual video visit using a synchronous, two-way, audio-video telehealth technology platform  This Virtual  Visit was conducted, with patient's consent, to reduce the patient's risk of exposure to COVID-19 and provide continuity of care.

## 2021-02-15 RX ORDER — PEN NEEDLE, DIABETIC 32GX 5/32"
NEEDLE, DISPOSABLE MISCELLANEOUS
Qty: 250 EACH | Refills: 5 | Status: SHIPPED
Start: 2021-02-15 | End: 2022-03-23

## 2021-06-01 RX ORDER — SEMAGLUTIDE 1.34 MG/ML
0.5 INJECTION, SOLUTION SUBCUTANEOUS
Qty: 4.5 ML | Refills: 3 | Status: SHIPPED
Start: 2021-06-01 | End: 2021-12-07

## 2021-06-07 RX ORDER — INSULIN LISPRO 100 [IU]/ML
INJECTION, SOLUTION INTRAVENOUS; SUBCUTANEOUS
Qty: 90 ML | Refills: 3 | Status: SHIPPED
Start: 2021-06-07 | End: 2022-06-03

## 2021-08-03 ENCOUNTER — OFFICE VISIT (OUTPATIENT)
Dept: ENDOCRINOLOGY | Age: 51
End: 2021-08-03
Payer: COMMERCIAL

## 2021-08-03 VITALS
HEART RATE: 78 BPM | OXYGEN SATURATION: 95 % | WEIGHT: 293 LBS | RESPIRATION RATE: 22 BRPM | HEIGHT: 62 IN | DIASTOLIC BLOOD PRESSURE: 72 MMHG | BODY MASS INDEX: 53.92 KG/M2 | SYSTOLIC BLOOD PRESSURE: 126 MMHG

## 2021-08-03 DIAGNOSIS — K74.60 CIRRHOSIS OF LIVER WITHOUT ASCITES, UNSPECIFIED HEPATIC CIRRHOSIS TYPE (HCC): ICD-10-CM

## 2021-08-03 DIAGNOSIS — E55.9 VITAMIN D DEFICIENCY: ICD-10-CM

## 2021-08-03 DIAGNOSIS — E11.9 TYPE 2 DIABETES MELLITUS WITHOUT COMPLICATION, UNSPECIFIED WHETHER LONG TERM INSULIN USE (HCC): Primary | ICD-10-CM

## 2021-08-03 LAB — HBA1C MFR BLD: 6.9 %

## 2021-08-03 PROCEDURE — 99214 OFFICE O/P EST MOD 30 MIN: CPT | Performed by: INTERNAL MEDICINE

## 2021-08-03 PROCEDURE — 83036 HEMOGLOBIN GLYCOSYLATED A1C: CPT | Performed by: INTERNAL MEDICINE

## 2021-08-03 RX ORDER — FLASH GLUCOSE SENSOR
KIT MISCELLANEOUS
Qty: 6 EACH | Refills: 3 | Status: SHIPPED
Start: 2021-08-03 | End: 2022-06-10 | Stop reason: SDUPTHER

## 2021-08-03 NOTE — PROGRESS NOTES
[address] [place] [address] Department of Endocrinology Diabetes and Metabolism   [address] [facility] [address]   Phone: [phone]  Fax: [phone]    Date of Service: 8/4/2021   [facility]  Primary Care Physician: Petey Owens MD  Provider: Adwoa Rodríguez MD     Reason for the visit:  DM type 2     History of Present Illness: The history is provided by the patient. No  was used. Accuracy of the patient data is excellent. Tramaine Yu is a very pleasant 46 y.o. female with past medical history of liver cirrhosis seen today for follow up visit     Tramaine Yu was diagnosed with diabetes at age 37 and she is currently on Toujeo 48 units daily at bedtime, Humalog 25 units with meals + ss 3:50>150, Ozempic 0.5 mg weekly  The patient has been checking blood sugar 5-8 time a days using freestyle helene and most readings at high   Most recent A1c results summarized below  Lab Results   Component Value Date    LABA1C 6.9 08/03/2021    LABA1C 8.1 12/23/2019    LABA1C 9.3 10/15/2019     Patient has had no hypoglycemic episodes   The patient has been mindful of what has been eating and following diabetic diet as encouraged  I reviewed current medications and the patient has no issues with diabetes medications  Tramaine Yu is up to date with eye exam and denied any history of diabetic retinopathy   The patient seeing podiatrist every few weeks and also performs  own feet care  Microvascular complications:  No Retinopathy, Nephropathy or Neuropathy   Macrovascular complications: no CAD, PVD, or Stroke  The patient receives Flushot every year and up to date with the Pneumonia vaccine     PAST MEDICAL HISTORY   Past Medical History:   Diagnosis Date    DM type 2 (diabetes mellitus, type 2) ([address]     [facility] cirrhosis [facility]     Morbid obesity ([facility]     Psoriasis     Vitamin D deficiency      PAST SURGICAL HISTORY   No past surgical history on file.   SOCIAL HISTORY   Tobacco:   reports that she has quit smoking. She has never used smokeless tobacco.  Alcol:   has no history on file for alcohol use. Illicit Drugs:   has no history on file for drug use. FAMILY HISTORY   Family History   Problem Relation Age of Onset    Diabetes Neg Hx      ALLERGIES AND DRUG REACTIONS   Allergies   Allergen Reactions    Demerol Hcl [Meperidine]        CURRENT MEDICATIONS   Current Outpatient Medications   Medication Sig Dispense Refill    Continuous Blood Gluc Sensor (FREESTYLE SHANELLE 2 SENSOR) MISC Change sensor every 14 days 6 each 3    insulin lispro, 1 Unit Dial, (HUMALOG KWIKPEN) 100 UNIT/ML SOPN INJECT SUBCUTANEOUSLY 30  UNITS BEFORE MEALS SLIDING  SCALE.  UNITS DAILY 90 mL 3    OZEMPIC, 0.25 OR 0.5 MG/DOSE, 2 MG/1.5ML SOPN INJECT 0.5 MG INTO THE SKIN EVERY 7 DAYS 4.5 mL 3    Insulin Pen Needle (BD PEN NEEDLE ERIC 2ND GEN) 32G X 4 MM MISC USE WITH INSULIN 4 TIMES A  each 5    predniSONE (DELTASONE) 5 MG tablet take 3 tablets by mouth once daily for 1 week then take 2 tablets. ..  (REFER TO PRESCRIPTION NOTES).  methotrexate (RHEUMATREX) 2.5 MG chemo tablet Take by mouth once a week 5 tablets every friday      Insulin Glargine, 2 Unit Dial, (TOUJEO MAX SOLOSTAR) 300 UNIT/ML SOPN Inject 55 Units into the skin nightly (Patient taking differently: Inject 55 Units into the skin nightly Taking 48 in the am) 10 pen 5    Secukinumab (COSENTYX, 300 MG DOSE, SC) Inject into the skin every 30 days       meloxicam (MOBIC) 15 MG tablet Take 15 mg by mouth daily      citalopram (CELEXA) 20 MG tablet Take 20 mg by mouth daily      rOPINIRole (REQUIP) 0.5 MG tablet Take 0.5 mg by mouth 3 times daily      vitamin D (CHOLECALCIFEROL) 1000 UNIT TABS tablet Take 1,000 Units by mouth daily      IRON PO Take 45 mg by mouth daily       No current facility-administered medications for this visit.        Review of Systems  Constitutional: No fever, no chills, no diaphoresis, no generalized weakness. HEENT: No blurred vision, No sore throat, no ear pain, no hair loss  Neck: denied any neck swelling, difficulty swallowing,   Cardio-pulmonary: No CP, SOB or palpitation, No orthopnea or PND. No cough or wheezing. GI: No N/V/D, no constipation, No abdominal pain, no melena or hematochezia   : Denied any dysuria, hematuria, flank pain, discharge, or incontinence. Skin: denied any rash, ulcer, Hirsute, or hyperpigmentation. MSK: denied any joint deformity, joint pain/swelling, muscle pain, or back pain. Neuro: no numbness, no tingling, no weakness, _    OBJECTIVE    /72   Pulse 78   Resp 22   Ht 5' 2\" (1.575 m)   Wt (!) 309 lb (140.2 kg)   SpO2 95%   BMI 56.52 kg/m²   BP Readings from Last 4 Encounters:   08/03/21 126/72   01/23/20 134/82   10/15/19 132/84     Wt Readings from Last 6 Encounters:   08/03/21 (!) 309 lb (140.2 kg)   01/23/20 (!) 300 lb 9.6 oz (136.4 kg)   10/15/19 292 lb 3.2 oz (132.5 kg)     Physical examination:  Due to this being a TeleHealth encounter, evaluation of the following organ systems is limited: Vitals/Constitutional/EENT/Resp/CV/GI//MS/Neuro/Skin/Heme-Lymph-Imm. Modified physical exam through Telemedicine camera    General: Communicating well via camera   Neck: no obvious neck mass. No obvious neck deformity     CVS: no distress   Chest: no distress. Chest is moving with respiration    Extremities:  no visible tremor  Skin: No visible rashes as seen from camera   Musculoskeletal: no visible deformity  Neuro: Alert and oriented to person, place, and time. Psychiatric: Normal mood and affect.  Behavior is normal      Review of Laboratory Data:  I personally reviewed the following lab:  Component Value   Glucose 295 (H)   Urea Nitrogen 14   Creatinine 0.74   BUN/CREATININE RATIO 19   Total Bilirubin 0.6   Total Protein 8.1   Albumin 3.1 (L)   A/G RATIO 0.6   Calcium(Ca) 9.1   Alkaline Phosphatase 165 (H)   Aspartate Aminot.(AST) 17   Alanine Aminotrans(ALT) 27   Sodium(Na) 137   Potassium(K) 3.8   Chloride(Cl) 104   Carbon Dioxide(CO2) 29.0   ANION GAP 7.8   CALCULATED OSMOLALITY 295     VITAMIN D 25-OH SCREEN FOR DEFICIENCY/TOXICITYResulted: 8/5/2020 6:41 PM  Component Name Value Ref Range    32Vitamin D, 25-Hydroxy        No results found for: WBC, RBC, HGB, HCT, MCV, MCH, MCHC, RDW, PLT, MPV, GRANULOCYTES, BANDS   Lab Results   Component Value Date/Time     12/21/2019 12:00 AM    K 4.3 12/21/2019 12:00 AM    CREATININE 0.59 12/21/2019 12:00 AM    CALCIUM 9.0 12/21/2019 12:00 AM    LABGLOM 106 12/21/2019 12:00 AM      No results found for: TSH, T4FREE, C4OOVYY, FT3, T2GUUEJ, TSI, TPOABS, THGAB  Lab Results   Component Value Date    LABA1C 6.9 08/03/2021    MALBCR 11.0 12/21/2019    LABCREA 111.00 12/21/2019     Lab Results   Component Value Date    LABA1C 6.9 08/03/2021    LABA1C 8.1 12/23/2019    LABA1C 9.3 10/15/2019     No results found for: CHOL, TRIG, HDL  Lab Results   Component Value Date    VITD25 30.3 12/23/2019     ASSESSMENT & RECOMMENDATIONS   Ade Diaz, a 46 y.o.-old female seen in for the following issues     Diabetes Mellitus Type 2     · A1c 6.9%  · Pt on steroids   · Change regimen to Toujeo 25 units daily at bedtime, Humalog 22units with meals + ss 3:50>150, Ozempic 0.5 mg weekly, start Jardiance 25 mg daily   · The patient was advised to checking BS using freestyle Emily scanner   · The patient counseled about the complications of uncontrolled diabetes     Obesity   on GLP-1 agonist     Liver cirrhosis   · Pts with liver cirrhosis are at higher risk of hypoglycemia   · Continue monitoring BG with freestyle Emily scanner     I personally reviewed external notes from PCP and other patient's care team providers, and personally interpreted labs associated with the above diagnosis.  I also ordered labs to further assess and manage the above addressed medical conditions    Return in about 4 months (around 12/3/2021) for DM type 2, VitD deficiency. The above issues were reviewed with the patient who understood and agreed with the plan. Thank you for allowing us to participate in the care of this patient. Please do not hesitate to contact us with any additional questions. Diagnosis Orders   1. Type 2 diabetes mellitus without complication, unspecified whether long term insulin use (HCC)  POCT glycosylated hemoglobin (Hb A1C)    Continuous Blood Gluc Sensor (FREESTYLE SHNAELLE 2 SENSOR) MISC    Hemoglobin A1C   2. Vitamin D deficiency     3. Cirrhosis of liver without ascites, unspecified hepatic cirrhosis type ([facility]       Ania Fitzpatrick MD  Endocrinologist, [facility]   [address] [place] [address]   Phone: [phone]  Fax: [phone]  -----------------------------  An electronic signature was used to authenticate this note.  Armando Dumont MD on 8/4/2021 at 8:05 AM n

## 2021-08-18 DIAGNOSIS — E11.9 TYPE 2 DIABETES MELLITUS WITHOUT COMPLICATION, UNSPECIFIED WHETHER LONG TERM INSULIN USE (HCC): Primary | ICD-10-CM

## 2021-08-18 RX ORDER — EMPAGLIFLOZIN 25 MG/1
25 TABLET, FILM COATED ORAL DAILY
Qty: 30 TABLET | Refills: 5 | Status: SHIPPED
Start: 2021-08-18 | End: 2021-09-10 | Stop reason: CLARIF

## 2021-09-08 ENCOUNTER — TELEPHONE (OUTPATIENT)
Dept: ENDOCRINOLOGY | Age: 51
End: 2021-09-08

## 2021-09-10 DIAGNOSIS — E11.9 TYPE 2 DIABETES MELLITUS WITHOUT COMPLICATION, UNSPECIFIED WHETHER LONG TERM INSULIN USE (HCC): Primary | ICD-10-CM

## 2021-09-14 ENCOUNTER — TELEPHONE (OUTPATIENT)
Dept: ENDOCRINOLOGY | Age: 51
End: 2021-09-14

## 2021-09-14 DIAGNOSIS — E11.9 TYPE 2 DIABETES MELLITUS WITHOUT COMPLICATION, UNSPECIFIED WHETHER LONG TERM INSULIN USE (HCC): Primary | ICD-10-CM

## 2021-09-14 RX ORDER — EMPAGLIFLOZIN 25 MG/1
25 TABLET, FILM COATED ORAL DAILY
Qty: 30 TABLET | Refills: 5 | Status: SHIPPED
Start: 2021-09-14 | End: 2021-12-07 | Stop reason: SDUPTHER

## 2021-09-14 NOTE — TELEPHONE ENCOUNTER
I spoke to the pt earlier today. An Rolanda Dukes was submitted and approved for Jardiance. The pt is aware.

## 2021-12-07 ENCOUNTER — OFFICE VISIT (OUTPATIENT)
Dept: ENDOCRINOLOGY | Age: 51
End: 2021-12-07
Payer: COMMERCIAL

## 2021-12-07 VITALS
OXYGEN SATURATION: 97 % | WEIGHT: 293 LBS | RESPIRATION RATE: 16 BRPM | DIASTOLIC BLOOD PRESSURE: 69 MMHG | BODY MASS INDEX: 53.92 KG/M2 | HEART RATE: 99 BPM | HEIGHT: 62 IN | SYSTOLIC BLOOD PRESSURE: 142 MMHG

## 2021-12-07 DIAGNOSIS — E55.9 VITAMIN D DEFICIENCY: ICD-10-CM

## 2021-12-07 DIAGNOSIS — E11.9 TYPE 2 DIABETES MELLITUS WITHOUT COMPLICATION, UNSPECIFIED WHETHER LONG TERM INSULIN USE (HCC): ICD-10-CM

## 2021-12-07 DIAGNOSIS — E66.01 CLASS 3 SEVERE OBESITY DUE TO EXCESS CALORIES WITHOUT SERIOUS COMORBIDITY WITH BODY MASS INDEX (BMI) OF 50.0 TO 59.9 IN ADULT (HCC): ICD-10-CM

## 2021-12-07 DIAGNOSIS — K74.60 CIRRHOSIS OF LIVER WITHOUT ASCITES, UNSPECIFIED HEPATIC CIRRHOSIS TYPE (HCC): ICD-10-CM

## 2021-12-07 DIAGNOSIS — E11.9 TYPE 2 DIABETES MELLITUS WITHOUT COMPLICATION, UNSPECIFIED WHETHER LONG TERM INSULIN USE (HCC): Primary | ICD-10-CM

## 2021-12-07 LAB
ALBUMIN SERPL-MCNC: 3.8 G/DL (ref 3.5–5.2)
ALP BLD-CCNC: 183 U/L (ref 35–104)
ALT SERPL-CCNC: 33 U/L (ref 0–32)
ANION GAP SERPL CALCULATED.3IONS-SCNC: 11 MMOL/L (ref 7–16)
AST SERPL-CCNC: 32 U/L (ref 0–31)
BILIRUB SERPL-MCNC: 0.6 MG/DL (ref 0–1.2)
BUN BLDV-MCNC: 12 MG/DL (ref 6–20)
CALCIUM SERPL-MCNC: 9.7 MG/DL (ref 8.6–10.2)
CHLORIDE BLD-SCNC: 103 MMOL/L (ref 98–107)
CHOLESTEROL, TOTAL: 189 MG/DL (ref 0–199)
CO2: 27 MMOL/L (ref 22–29)
CREAT SERPL-MCNC: 0.7 MG/DL (ref 0.5–1)
CREATININE URINE: 80 MG/DL (ref 29–226)
GFR AFRICAN AMERICAN: >60
GFR NON-AFRICAN AMERICAN: >60 ML/MIN/1.73
GLUCOSE BLD-MCNC: 133 MG/DL (ref 74–99)
HBA1C MFR BLD: 7.7 %
HDLC SERPL-MCNC: 49 MG/DL
LDL CHOLESTEROL CALCULATED: 111 MG/DL (ref 0–99)
MICROALBUMIN UR-MCNC: <12 MG/L
MICROALBUMIN/CREAT UR-RTO: ABNORMAL (ref 0–30)
POTASSIUM SERPL-SCNC: 4.1 MMOL/L (ref 3.5–5)
SODIUM BLD-SCNC: 141 MMOL/L (ref 132–146)
TOTAL PROTEIN: 8.2 G/DL (ref 6.4–8.3)
TRIGL SERPL-MCNC: 146 MG/DL (ref 0–149)
TSH SERPL DL<=0.05 MIU/L-ACNC: 2.41 UIU/ML (ref 0.27–4.2)
VITAMIN D 25-HYDROXY: 45 NG/ML (ref 30–100)
VLDLC SERPL CALC-MCNC: 29 MG/DL

## 2021-12-07 PROCEDURE — 83036 HEMOGLOBIN GLYCOSYLATED A1C: CPT | Performed by: CLINICAL NURSE SPECIALIST

## 2021-12-07 PROCEDURE — 99214 OFFICE O/P EST MOD 30 MIN: CPT | Performed by: CLINICAL NURSE SPECIALIST

## 2021-12-07 PROCEDURE — 3051F HG A1C>EQUAL 7.0%<8.0%: CPT | Performed by: CLINICAL NURSE SPECIALIST

## 2021-12-07 PROCEDURE — 95251 CONT GLUC MNTR ANALYSIS I&R: CPT | Performed by: CLINICAL NURSE SPECIALIST

## 2021-12-07 RX ORDER — EMPAGLIFLOZIN 25 MG/1
25 TABLET, FILM COATED ORAL DAILY
Qty: 90 TABLET | Refills: 3 | Status: SHIPPED
Start: 2021-12-07 | End: 2022-08-10

## 2021-12-07 NOTE — PROGRESS NOTES
[address] [place] [address] Department of Endocrinology Diabetes and Metabolism   [address] [facility] [address]   Phone: [phone]  Fax: [phone]    Date of Service: 12/7/2021    Primary Care Physician: Shona Peña MD  Referring physician: No ref. provider found  Provider: DAMIAN Pearson - CNS     Reason for the visit:  Type 2 DM       History of Present Illness: The history is provided by the patient. No  was used. Accuracy of the patient data is excellent. Daphney Langley is a very pleasant 46 y.o. female seen today for diabetes management     Daphney Langley was diagnosed with diabetes at age 37 and she is currently on Toujeo 48 units daily at bedtime, Humalog 22 units with meals + ss 3:50>150, Ozempic 0.5 mg weekly, jardiance 25 mg daily   The patient has been checking blood sugar 5-8 time a days using freestyle helene  Prednisone 5 mg daily  CGM reviewed.   Average blood glucose 234, time in range 24%, no hypoglycemia    Most recent A1c results summarized below  Lab Results   Component Value Date    LABA1C 7.7 12/07/2021    LABA1C 6.9 08/03/2021    LABA1C 8.1 12/23/2019   Patient has had no hypoglycemic episodes   The patient has been mindful of what has been eating and following diabetic diet as encouraged  I reviewed current medications and the patient has no issues with diabetes medications  Daphney Langley is up to date with eye exam and denied any history of diabetic retinopathy   The patient seeing podiatrist every few weeks and also performs  own feet care  Microvascular complications:  No Retinopathy, Nephropathy or Neuropathy   Macrovascular complications: no CAD, PVD, or Stroke  The patient receives Flushot every year and up to date with the Pneumonia vaccine          PAST MEDICAL HISTORY   Past Medical History:   Diagnosis Date    DM type 2 (diabetes mellitus, type 2) ([address]     Liver cirrhosis ([address]     Morbid obesity ([address]     Psoriasis     Vitamin D deficiency        PAST SURGICAL HISTORY   No past surgical history on file. SOCIAL HISTORY   Tobacco:   reports that she quit smoking about 21 years ago. She has a 5.00 pack-year smoking history. She has never used smokeless tobacco.  Alcohol:   has no history on file for alcohol use. Drugs:   has no history on file for drug use. FAMILY HISTORY   Family History   Problem Relation Age of Onset    Diabetes Neg Hx        ALLERGIES AND DRUG REACTIONS   Allergies   Allergen Reactions    Demerol Hcl [Meperidine]        CURRENT MEDICATIONS   Current Outpatient Medications   Medication Sig Dispense Refill    Semaglutide, 1 MG/DOSE, 4 MG/3ML SOPN Inject 1 mg weekly subcutaneous 3 mL 3    empagliflozin (JARDIANCE) 25 MG tablet Take 25 mg by mouth daily 90 tablet 3    Continuous Blood Gluc Sensor (FREESTYLE SHANELLE 2 SENSOR) MISC Change sensor every 14 days 6 each 3    insulin lispro, 1 Unit Dial, (HUMALOG KWIKPEN) 100 UNIT/ML SOPN INJECT SUBCUTANEOUSLY 30  UNITS BEFORE MEALS SLIDING  SCALE.  UNITS DAILY 90 mL 3    Insulin Pen Needle (BD PEN NEEDLE ERIC 2ND GEN) 32G X 4 MM MISC USE WITH INSULIN 4 TIMES A  each 5    predniSONE (DELTASONE) 5 MG tablet take 3 tablets by mouth once daily for 1 week then take 2 tablets. ..  (REFER TO PRESCRIPTION NOTES).       methotrexate (RHEUMATREX) 2.5 MG chemo tablet Take by mouth once a week 5 tablets every friday      Insulin Glargine, 2 Unit Dial, (TOUJEO MAX SOLOSTAR) 300 UNIT/ML SOPN Inject 55 Units into the skin nightly (Patient taking differently: Inject 55 Units into the skin nightly Taking 48 in the am) 10 pen 5    Secukinumab (COSENTYX, 300 MG DOSE, SC) Inject into the skin every 30 days       meloxicam (MOBIC) 15 MG tablet Take 15 mg by mouth daily      citalopram (CELEXA) 20 MG tablet Take 20 mg by mouth daily      rOPINIRole (REQUIP) 0.5 MG tablet Take 0.5 mg by mouth 3 times daily      vitamin D (CHOLECALCIFEROL) 1000 UNIT TABS tablet Take 1,000 Units by mouth daily      IRON PO Take 45 mg by mouth daily       No current facility-administered medications for this visit. Review of Systems  Constitutional: No fever, no chills, no diaphoresis, no generalized weakness. HEENT: No blurred vision, No sore throat, no ear pain, no hair loss  Neck: denied any neck swelling, difficulty swallowing,   Cardio-pulmonary: No CP, SOB or palpitation, No orthopnea or PND. No cough or wheezing. GI: No N/V/D, no constipation, No abdominal pain, no melena or hematochezia   : Denied any dysuria, hematuria, flank pain, discharge, or incontinence. Skin: denied any rash, ulcer, Hirsute, or hyperpigmentation. MSK: denied any joint deformity, joint pain/swelling, muscle pain, or back pain. Neuro: no numbness, no tingling, no weakness, _    OBJECTIVE    BP (!) 142/69   Pulse 99   Resp 16   Ht 5' 2\" (1.575 m)   Wt (!) 302 lb (137 kg)   SpO2 97%   BMI 55.24 kg/m²   BP Readings from Last 4 Encounters:   12/07/21 (!) 142/69   08/03/21 126/72   01/23/20 134/82   10/15/19 132/84     Wt Readings from Last 6 Encounters:   12/07/21 (!) 302 lb (137 kg)   08/03/21 (!) 309 lb (140.2 kg)   01/23/20 (!) 300 lb 9.6 oz (136.4 kg)   10/15/19 292 lb 3.2 oz (132.5 kg)       Physical examination:  General: awake alert, oriented x3, no abnormal position or movements. HEENT: normocephalic non-traumatic, no exophthalmos   Neck: supple, no LN enlargement, no thyromegaly, no thyroid tenderness, no JVD. Pulm: Clear equal air entry no added sounds, no wheezing or rhonchi    CVS: S1 + S2, no murmur, no heave. Dorsalis pedis pulse palpable   Abd: soft lax, no tenderness, no organomegaly, audible bowel sounds. Skin: warm, no lesions, no rash.  No callus, no Ulcers, No acanthosis nigricans  Musculoskeletal: No back tenderness, no kyphosis/scoliosis    Neuro: CN intact, Monofilament sensation normal bilateral , muscle power normal  Psych: normal mood, and affect      Review of Laboratory Data:  I personally reviewed the following lab:  No results found for: WBC, RBC, HGB, HCT, MCV, MCH, MCHC, RDW, PLT, MPV, GRANULOCYTES, BANDS   Lab Results   Component Value Date/Time     12/21/2019 12:00 AM    K 4.3 12/21/2019 12:00 AM    CREATININE 0.59 12/21/2019 12:00 AM    CALCIUM 9.0 12/21/2019 12:00 AM    LABGLOM 106 12/21/2019 12:00 AM      No results found for: TSH, T4FREE, G9HZBOF, FT3, B4DJLTP, TSI, TPOABS, THGAB  Lab Results   Component Value Date    LABA1C 7.7 12/07/2021    MALBCR 11.0 12/21/2019    LABCREA 111.00 12/21/2019     Lab Results   Component Value Date    LABA1C 7.7 12/07/2021    LABA1C 6.9 08/03/2021    LABA1C 8.1 12/23/2019     No results found for: TRIG, HDL, LDLCALC, CHOL  Lab Results   Component Value Date    VITD25 30.3 12/23/2019       ASSESSMENT & RECOMMENDATIONS   Shravan Estrella, a 46 y.o.-old female seen in for the following issues       Assessment:      Diagnosis Orders   1. Type 2 diabetes mellitus without complication, unspecified whether long term insulin use (HCC)  POCT glycosylated hemoglobin (Hb A1C)    Comprehensive Metabolic Panel    Lipid Panel    Microalbumin / Creatinine Urine Ratio    TSH without Reflex    empagliflozin (JARDIANCE) 25 MG tablet   2. Vitamin D deficiency  Vitamin D 25 Hydroxy   3. Class 3 severe obesity due to excess calories without serious comorbidity with body mass index (BMI) of 50.0 to 59.9 in adult ([address]     4. Cirrhosis of liver without ascites, unspecified hepatic cirrhosis type ([facility]         Plan:     1. Type 2 diabetes mellitus without complication, unspecified whether long term insulin use ([facility]   · Patient's diabetes is uncontrolled.   Plan: Increase Humalog to 25 units 3 times daily with meals plus insulin sliding scale  · Increase Toujeo to 54 units once per day  · Increase Ozempic to 1 mg weekly  · Continue Jardiance as above  · Will change DM regimen to   · The patient was advised to check blood sugars 4 times a day before meals and at 12/7/2021 at 4:22 PM

## 2021-12-20 ENCOUNTER — TELEPHONE (OUTPATIENT)
Dept: ENDOCRINOLOGY | Age: 51
End: 2021-12-20

## 2021-12-20 NOTE — TELEPHONE ENCOUNTER
Urine test negative for protein leak. Thyroid function and vitamin D within normal limits. Liver function tests are mildly elevated however she does have history of cirrhosis. These fax labs to PCP. LDL cholesterol was mildly elevated however HDL is at goal.  Continue same.

## 2022-01-26 RX ORDER — SEMAGLUTIDE 1.34 MG/ML
INJECTION, SOLUTION SUBCUTANEOUS
Qty: 9 ML | Refills: 3 | Status: SHIPPED
Start: 2022-01-26 | End: 2022-09-15 | Stop reason: SDUPTHER

## 2022-03-23 RX ORDER — PEN NEEDLE, DIABETIC 32GX 5/32"
NEEDLE, DISPOSABLE MISCELLANEOUS
Qty: 300 EACH | Refills: 3 | Status: SHIPPED
Start: 2022-03-23 | End: 2022-09-15 | Stop reason: SDUPTHER

## 2022-03-23 RX ORDER — PEN NEEDLE, DIABETIC 32GX 5/32"
NEEDLE, DISPOSABLE MISCELLANEOUS
Qty: 200 EACH | Refills: 5 | Status: SHIPPED
Start: 2022-03-23 | End: 2022-03-23

## 2022-04-11 ENCOUNTER — OFFICE VISIT (OUTPATIENT)
Dept: ENDOCRINOLOGY | Age: 52
End: 2022-04-11
Payer: COMMERCIAL

## 2022-04-11 VITALS
WEIGHT: 293 LBS | HEIGHT: 62 IN | SYSTOLIC BLOOD PRESSURE: 134 MMHG | HEART RATE: 81 BPM | BODY MASS INDEX: 53.92 KG/M2 | DIASTOLIC BLOOD PRESSURE: 72 MMHG

## 2022-04-11 DIAGNOSIS — K74.60 CIRRHOSIS OF LIVER WITHOUT ASCITES, UNSPECIFIED HEPATIC CIRRHOSIS TYPE (HCC): ICD-10-CM

## 2022-04-11 DIAGNOSIS — E66.01 CLASS 3 SEVERE OBESITY DUE TO EXCESS CALORIES WITHOUT SERIOUS COMORBIDITY WITH BODY MASS INDEX (BMI) OF 50.0 TO 59.9 IN ADULT (HCC): ICD-10-CM

## 2022-04-11 DIAGNOSIS — E11.9 TYPE 2 DIABETES MELLITUS WITHOUT COMPLICATION, UNSPECIFIED WHETHER LONG TERM INSULIN USE (HCC): Primary | ICD-10-CM

## 2022-04-11 DIAGNOSIS — E55.9 VITAMIN D DEFICIENCY: ICD-10-CM

## 2022-04-11 LAB — HBA1C MFR BLD: 7.2 %

## 2022-04-11 PROCEDURE — 83036 HEMOGLOBIN GLYCOSYLATED A1C: CPT | Performed by: INTERNAL MEDICINE

## 2022-04-11 PROCEDURE — 99214 OFFICE O/P EST MOD 30 MIN: CPT | Performed by: INTERNAL MEDICINE

## 2022-04-11 PROCEDURE — 3051F HG A1C>EQUAL 7.0%<8.0%: CPT | Performed by: INTERNAL MEDICINE

## 2022-04-11 NOTE — PROGRESS NOTES
[address] [place] [address] Department of Endocrinology Diabetes and Metabolism   [address] [facility] [address]   Phone: [phone]  Fax: [phone]    Date of Service: 4/11/2022    Primary Care Physician: Abdullahi Wu MD  Referring physician: No ref. provider found  Provider: Digna Lombardi MD     Reason for the visit:  Type 2 DM       History of Present Illness: The history is provided by the patient. No  was used. Accuracy of the patient data is excellent. Christine Jones is a very pleasant 45 y.o. female seen today for diabetes management     Christine Jones was diagnosed with diabetes at age 37 and she is currently on Toujeo 48 units daily at bedtime, Humalog 22 units with meals + ss 3:50>150, Ozempic 0.5 mg weekly, jardiance 25 mg daily   The patient has been checking blood sugar 5-8 time a days using freestyle helene  Prednisone 5 mg daily  CGM reviewed.   Average blood glucose 234, time in range 24%, no hypoglycemia    Most recent A1c results summarized below  Lab Results   Component Value Date    LABA1C 7.2 04/11/2022    LABA1C 7.7 12/07/2021    LABA1C 6.9 08/03/2021   Patient has had no hypoglycemic episodes   The patient has been mindful of what has been eating and following diabetic diet as encouraged  I reviewed current medications and the patient has no issues with diabetes medications  Christine Jones is up to date with eye exam and denied any history of diabetic retinopathy   The patient seeing podiatrist every few weeks and also performs  own feet care  Microvascular complications:  No Retinopathy, Nephropathy or Neuropathy   Macrovascular complications: no CAD, PVD, or Stroke  The patient receives Flushot every year and up to date with the Pneumonia vaccine          PAST MEDICAL HISTORY   Past Medical History:   Diagnosis Date    DM type 2 (diabetes mellitus, type 2) ([address]     Liver cirrhosis ([address]     Morbid obesity ([address]     Psoriasis     Vitamin D deficiency        PAST SURGICAL HISTORY   No past surgical history on file. SOCIAL HISTORY   Tobacco:   reports that she quit smoking about 22 years ago. She has a 5.00 pack-year smoking history. She has never used smokeless tobacco.  Alcohol:   has no history on file for alcohol use. Drugs:   has no history on file for drug use. FAMILY HISTORY   Family History   Problem Relation Age of Onset    Diabetes Neg Hx        ALLERGIES AND DRUG REACTIONS   Allergies   Allergen Reactions    Demerol Hcl [Meperidine]        CURRENT MEDICATIONS   Current Outpatient Medications   Medication Sig Dispense Refill    Insulin Pen Needle (DROPLET PEN NEEDLES) 32G X 4 MM MISC USE WITH INSULIN 4 TIMES A  each 3    Semaglutide, 1 MG/DOSE, (OZEMPIC, 1 MG/DOSE,) 4 MG/3ML SOPN INJECT 1 MG WEEKLY  SUBCUTANEOUSLY 9 mL 3    TOUJEO MAX SOLOSTAR 300 UNIT/ML SOPN INJECT 55 UNITS INTO THE  SKIN AT NIGHT 18 mL 3    empagliflozin (JARDIANCE) 25 MG tablet Take 25 mg by mouth daily 90 tablet 3    Continuous Blood Gluc Sensor (FREESTYLE SHANELLE 2 SENSOR) MISC Change sensor every 14 days 6 each 3    insulin lispro, 1 Unit Dial, (HUMALOG KWIKPEN) 100 UNIT/ML SOPN INJECT SUBCUTANEOUSLY 30  UNITS BEFORE MEALS SLIDING  SCALE.  UNITS DAILY 90 mL 3    predniSONE (DELTASONE) 5 MG tablet take 3 tablets by mouth once daily for 1 week then take 2 tablets. ..  (REFER TO PRESCRIPTION NOTES).       methotrexate (RHEUMATREX) 2.5 MG chemo tablet Take by mouth once a week 5 tablets every friday      Secukinumab (COSENTYX, 300 MG DOSE, SC) Inject into the skin every 30 days       meloxicam (MOBIC) 15 MG tablet Take 15 mg by mouth daily      citalopram (CELEXA) 20 MG tablet Take 20 mg by mouth daily      rOPINIRole (REQUIP) 0.5 MG tablet Take 0.5 mg by mouth 3 times daily      vitamin D (CHOLECALCIFEROL) 1000 UNIT TABS tablet Take 1,000 Units by mouth daily      IRON PO Take 45 mg by mouth daily       No current facility-administered medications for this visit. Review of Systems  Constitutional: No fever, no chills, no diaphoresis, no generalized weakness. HEENT: No blurred vision, No sore throat, no ear pain, no hair loss  Neck: denied any neck swelling, difficulty swallowing,   Cardio-pulmonary: No CP, SOB or palpitation, No orthopnea or PND. No cough or wheezing. GI: No N/V/D, no constipation, No abdominal pain, no melena or hematochezia   : Denied any dysuria, hematuria, flank pain, discharge, or incontinence. Skin: denied any rash, ulcer, Hirsute, or hyperpigmentation. MSK: denied any joint deformity, joint pain/swelling, muscle pain, or back pain. Neuro: no numbness, no tingling, no weakness, _    OBJECTIVE    /72   Pulse 81   Ht 5' 2\" (1.575 m)   Wt (!) 301 lb (136.5 kg)   BMI 55.05 kg/m²   BP Readings from Last 4 Encounters:   04/11/22 134/72   12/07/21 (!) 142/69   08/03/21 126/72   01/23/20 134/82     Wt Readings from Last 6 Encounters:   04/11/22 (!) 301 lb (136.5 kg)   12/07/21 (!) 302 lb (137 kg)   08/03/21 (!) 309 lb (140.2 kg)   01/23/20 (!) 300 lb 9.6 oz (136.4 kg)   10/15/19 292 lb 3.2 oz (132.5 kg)       Physical examination:  General: awake alert, oriented x3, no abnormal position or movements. HEENT: normocephalic non-traumatic, no exophthalmos   Neck: supple, no LN enlargement, no thyromegaly, no thyroid tenderness, no JVD. Pulm: Clear equal air entry no added sounds, no wheezing or rhonchi    CVS: S1 + S2, no murmur, no heave. Dorsalis pedis pulse palpable   Abd: soft lax, no tenderness, no organomegaly, audible bowel sounds. Skin: warm, no lesions, no rash.  No callus, no Ulcers, No acanthosis nigricans  Musculoskeletal: No back tenderness, no kyphosis/scoliosis    Neuro: CN intact, Monofilament sensation normal bilateral , muscle power normal  Psych: normal mood, and affect      Review of Laboratory Data:  I personally reviewed the following lab:  No results found for: WBC, RBC, HGB, HCT, MCV, MCH, MCHC, RDW, PLT, MPV, GRANULOCYTES, CHINO, BANDS   Lab Results   Component Value Date/Time     12/07/2021 04:24 PM    K 4.1 12/07/2021 04:24 PM    CO2 27 12/07/2021 04:24 PM    BUN 12 12/07/2021 04:24 PM    CREATININE 0.7 12/07/2021 04:24 PM    CALCIUM 9.7 12/07/2021 04:24 PM    LABGLOM >60 12/07/2021 04:24 PM    GFRAA >60 12/07/2021 04:24 PM      Lab Results   Component Value Date/Time    TSH 2.410 12/07/2021 04:24 PM     Lab Results   Component Value Date    LABA1C 7.2 04/11/2022    GLUCOSE 133 12/07/2021    MALBCR - 12/07/2021    LABMICR <12.0 12/07/2021    LABCREA 80 12/07/2021     Lab Results   Component Value Date    LABA1C 7.2 04/11/2022    LABA1C 7.7 12/07/2021    LABA1C 6.9 08/03/2021     Lab Results   Component Value Date    TRIG 146 12/07/2021    HDL 49 12/07/2021    LDLCALC 111 12/07/2021    CHOL 189 12/07/2021     Lab Results   Component Value Date    VITD25 45 12/07/2021    VITD25 30.3 12/23/2019       ASSESSMENT & RECOMMENDATIONS   Trever Regalado, a 45 y.o.-old female seen in for the following issues       Assessment:      Diagnosis Orders   1. Type 2 diabetes mellitus without complication, unspecified whether long term insulin use (HCC)  POCT glycosylated hemoglobin (Hb A1C)    Hemoglobin A1C   2. Vitamin D deficiency     3. Class 3 severe obesity due to excess calories without serious comorbidity with body mass index (BMI) of 50.0 to 59.9 in adult ([facility]     4. Cirrhosis of liver without ascites, unspecified hepatic cirrhosis type ([facility]         Plan:     1. Type 2 diabetes mellitus without complication, unspecified whether long term insulin use (HCC)   · Improving control   · Toujeo 48 units daily at bedtime, Humalog 22 units with meals + ss 3:50>150, Ozempic 0.5 mg weekly, jardiance 25 mg daily   · The patient was advised to check blood sugars 4 times a day before meals and at bedtime and send BS readings to our office in a week.   · Discussed with patient A1c and blood sugar goals   · The patient counseled about the complications of uncontrolled diabetes   · Patient was counselled about the importance of self-blood glucose monitoring and eating consistent carb diet to avoid blood sugar fluctuations    2. Vitamin D deficiency   Patient has not been on vitamin D supplementation. Will reassess vitamin D   3. Class 3 severe obesity due to excess calories without serious comorbidity with body mass index (BMI) of 50.0 to 59.9 in adult [facility])   Discussed lifestyle changes including diet and exercise with patient in depth. Also discussed with patient cardiovascular risk associated with obesity   4. Cirrhosis of liver without ascites, unspecified hepatic cirrhosis type ([address]   Patient at risk for hypoglycemia. Patient should continue freestyle CGM     I personally reviewed external notes from PCP and other patient's care team providers, and personally interpreted labs associated with the above diagnosis. I also ordered labs to further assess and manage the above addressed medical conditions    No follow-ups on file. The above issues were reviewed with the patient who understood and agreed with the plan. Thank you for allowing us to participate in the care of this patient. Please do not hesitate to contact us with any additional questions. MD DILIP [facility] - BEHAVIORAL HEALTH SERVICES Diabetes Care and Endocrinology   [address] [facility] [address]   Phone: [phone]  Fax: [phone]  --------------------------------------------  An electronic signature was used to authenticate this note.  Brendan AYOUB on 4/11/2022 at 3:41 PM

## 2022-06-03 RX ORDER — INSULIN LISPRO 100 [IU]/ML
INJECTION, SOLUTION INTRAVENOUS; SUBCUTANEOUS
Qty: 90 ML | Refills: 3 | Status: SHIPPED
Start: 2022-06-03 | End: 2022-09-15 | Stop reason: SDUPTHER

## 2022-06-10 DIAGNOSIS — E11.9 TYPE 2 DIABETES MELLITUS WITHOUT COMPLICATION, UNSPECIFIED WHETHER LONG TERM INSULIN USE (HCC): ICD-10-CM

## 2022-06-10 RX ORDER — FLASH GLUCOSE SENSOR
KIT MISCELLANEOUS
Qty: 6 EACH | Refills: 3 | Status: SHIPPED | OUTPATIENT
Start: 2022-06-10

## 2022-08-09 DIAGNOSIS — E11.9 TYPE 2 DIABETES MELLITUS WITHOUT COMPLICATION, UNSPECIFIED WHETHER LONG TERM INSULIN USE (HCC): ICD-10-CM

## 2022-08-10 RX ORDER — EMPAGLIFLOZIN 25 MG/1
TABLET, FILM COATED ORAL
Qty: 90 TABLET | Refills: 3 | Status: SHIPPED
Start: 2022-08-10 | End: 2022-09-15 | Stop reason: SDUPTHER

## 2022-08-22 DIAGNOSIS — E11.9 TYPE 2 DIABETES MELLITUS WITHOUT COMPLICATION, UNSPECIFIED WHETHER LONG TERM INSULIN USE (HCC): ICD-10-CM

## 2022-08-22 RX ORDER — INSULIN GLARGINE 300 U/ML
INJECTION, SOLUTION SUBCUTANEOUS
Qty: 2 PEN | Refills: 3 | Status: SHIPPED
Start: 2022-08-22 | End: 2022-09-15 | Stop reason: SDUPTHER

## 2022-09-15 ENCOUNTER — OFFICE VISIT (OUTPATIENT)
Dept: ENDOCRINOLOGY | Age: 52
End: 2022-09-15
Payer: COMMERCIAL

## 2022-09-15 VITALS
WEIGHT: 292 LBS | HEART RATE: 69 BPM | BODY MASS INDEX: 53.73 KG/M2 | HEIGHT: 62 IN | DIASTOLIC BLOOD PRESSURE: 56 MMHG | SYSTOLIC BLOOD PRESSURE: 114 MMHG | OXYGEN SATURATION: 93 %

## 2022-09-15 DIAGNOSIS — E66.01 CLASS 3 SEVERE OBESITY DUE TO EXCESS CALORIES WITHOUT SERIOUS COMORBIDITY WITH BODY MASS INDEX (BMI) OF 50.0 TO 59.9 IN ADULT (HCC): ICD-10-CM

## 2022-09-15 DIAGNOSIS — K74.60 CIRRHOSIS OF LIVER WITHOUT ASCITES, UNSPECIFIED HEPATIC CIRRHOSIS TYPE (HCC): ICD-10-CM

## 2022-09-15 DIAGNOSIS — E11.9 TYPE 2 DIABETES MELLITUS WITHOUT COMPLICATION, UNSPECIFIED WHETHER LONG TERM INSULIN USE (HCC): Primary | ICD-10-CM

## 2022-09-15 DIAGNOSIS — E55.9 VITAMIN D DEFICIENCY: ICD-10-CM

## 2022-09-15 LAB — HBA1C MFR BLD: 6.3 %

## 2022-09-15 PROCEDURE — 95251 CONT GLUC MNTR ANALYSIS I&R: CPT | Performed by: CLINICAL NURSE SPECIALIST

## 2022-09-15 PROCEDURE — 3044F HG A1C LEVEL LT 7.0%: CPT | Performed by: CLINICAL NURSE SPECIALIST

## 2022-09-15 PROCEDURE — 83036 HEMOGLOBIN GLYCOSYLATED A1C: CPT | Performed by: CLINICAL NURSE SPECIALIST

## 2022-09-15 PROCEDURE — 99214 OFFICE O/P EST MOD 30 MIN: CPT | Performed by: CLINICAL NURSE SPECIALIST

## 2022-09-15 RX ORDER — CARVEDILOL 6.25 MG/1
TABLET ORAL
COMMUNITY
Start: 2022-09-01

## 2022-09-15 RX ORDER — INSULIN LISPRO 100 [IU]/ML
INJECTION, SOLUTION INTRAVENOUS; SUBCUTANEOUS
Qty: 30 ADJUSTABLE DOSE PRE-FILLED PEN SYRINGE | Refills: 3 | Status: SHIPPED | OUTPATIENT
Start: 2022-09-15

## 2022-09-15 RX ORDER — INSULIN GLARGINE 300 U/ML
INJECTION, SOLUTION SUBCUTANEOUS
Qty: 5 ADJUSTABLE DOSE PRE-FILLED PEN SYRINGE | Refills: 3 | Status: SHIPPED | OUTPATIENT
Start: 2022-09-15

## 2022-09-15 RX ORDER — SEMAGLUTIDE 1.34 MG/ML
INJECTION, SOLUTION SUBCUTANEOUS
Qty: 9 ML | Refills: 3 | Status: SHIPPED | OUTPATIENT
Start: 2022-09-15

## 2022-09-15 RX ORDER — PEN NEEDLE, DIABETIC 32GX 5/32"
NEEDLE, DISPOSABLE MISCELLANEOUS
Qty: 400 EACH | Refills: 3 | Status: SHIPPED | OUTPATIENT
Start: 2022-09-15

## 2022-09-15 NOTE — PROGRESS NOTES
mellitus, type 2) ([facility]     Liver cirrhosis (HCC)     Morbid obesity ([facility]     Psoriasis     Vitamin D deficiency        PAST SURGICAL HISTORY   No past surgical history on file. SOCIAL HISTORY   Tobacco:   reports that she quit smoking about 22 years ago. She has a 5.00 pack-year smoking history. She has never used smokeless tobacco.  Alcohol:   has no history on file for alcohol use. Drugs:   has no history on file for drug use. FAMILY HISTORY   Family History   Problem Relation Age of Onset    Diabetes Neg Hx        ALLERGIES AND DRUG REACTIONS   Allergies   Allergen Reactions    Demerol Hcl [Meperidine]        CURRENT MEDICATIONS   Current Outpatient Medications   Medication Sig Dispense Refill    Insulin Glargine, 2 Unit Dial, (TOUJEO MAX SOLOSTAR) 300 UNIT/ML SOPN Inject 48 units at night 5 Adjustable Dose Pre-filled Pen Syringe 3    insulin lispro, 1 Unit Dial, (HUMALOG KWIKPEN) 100 UNIT/ML SOPN INJECT SUBCUTANEOUSLY 22  UNITS BEFORE MEALS SLIDING  SCALE.  UNITS DAILY 30 Adjustable Dose Pre-filled Pen Syringe 3    empagliflozin (JARDIANCE) 25 MG tablet TAKE 1 TABLET BY MOUTH  DAILY 90 tablet 3    Semaglutide, 1 MG/DOSE, (OZEMPIC, 1 MG/DOSE,) 4 MG/3ML SOPN INJECT 1 MG WEEKLY  SUBCUTANEOUSLY 9 mL 3    Insulin Pen Needle (DROPLET PEN NEEDLES) 32G X 4 MM MISC USE WITH INSULIN 4 TIMES A  each 3    carvedilol (COREG) 6.25 MG tablet take 1 tablet by mouth twice a day      Continuous Blood Gluc Sensor (FREESTYLE SHANELLE 2 SENSOR) MISC Change sensor every 14 days 6 each 3    predniSONE (DELTASONE) 5 MG tablet take 3 tablets by mouth once daily for 1 week then take 2 tablets. ..  (REFER TO PRESCRIPTION NOTES).       methotrexate (RHEUMATREX) 2.5 MG chemo tablet Take by mouth once a week 5 tablets every friday      Secukinumab (COSENTYX, 300 MG DOSE, SC) Inject into the skin every 30 days       meloxicam (MOBIC) 15 MG tablet Take 15 mg by mouth daily      citalopram (CELEXA) 20 MG tablet Take 20 mg by mouth daily      rOPINIRole (REQUIP) 0.5 MG tablet Take 0.5 mg by mouth 3 times daily      vitamin D (CHOLECALCIFEROL) 1000 UNIT TABS tablet Take 1,000 Units by mouth daily      IRON PO Take 45 mg by mouth daily       No current facility-administered medications for this visit. Review of Systems  Constitutional: No fever, no chills, no diaphoresis, no generalized weakness. HEENT: No blurred vision, No sore throat, no ear pain, no hair loss  Neck: denied any neck swelling, difficulty swallowing,   Cardio-pulmonary: No CP, SOB or palpitation, No orthopnea or PND. No cough or wheezing. GI: No N/V/D, no constipation, No abdominal pain, no melena or hematochezia   : Denied any dysuria, hematuria, flank pain, discharge, or incontinence. Skin: denied any rash, ulcer, Hirsute, or hyperpigmentation. MSK: denied any joint deformity, joint pain/swelling, muscle pain, or back pain. Neuro: no numbness, no tingling, no weakness, _    OBJECTIVE    BP (!) 114/56   Pulse 69   Ht 5' 2\" (1.575 m)   Wt 292 lb (132.5 kg)   SpO2 93%   BMI 53.41 kg/m²   BP Readings from Last 4 Encounters:   09/15/22 (!) 114/56   04/11/22 134/72   12/07/21 (!) 142/69   08/03/21 126/72     Wt Readings from Last 6 Encounters:   09/15/22 292 lb (132.5 kg)   04/11/22 (!) 301 lb (136.5 kg)   12/07/21 (!) 302 lb (137 kg)   08/03/21 (!) 309 lb (140.2 kg)   01/23/20 (!) 300 lb 9.6 oz (136.4 kg)   10/15/19 292 lb 3.2 oz (132.5 kg)       Physical examination:  General: awake alert, oriented x3, no abnormal position or movements. HEENT: normocephalic non-traumatic, no exophthalmos   Neck: supple, no LN enlargement, no thyromegaly, no thyroid tenderness, no JVD. Pulm: Clear equal air entry no added sounds, no wheezing or rhonchi    CVS: S1 + S2, no murmur, no heave. Dorsalis pedis pulse palpable   Abd: soft lax, no tenderness, no organomegaly, audible bowel sounds. Skin: warm, no lesions, no rash.  No callus, no Ulcers, No acanthosis nigricans  Musculoskeletal: No back tenderness, no kyphosis/scoliosis    Neuro: CN intact, Monofilament sensation normal bilateral , muscle power normal  Psych: normal mood, and affect      Review of Laboratory Data:  I personally reviewed the following lab:  No results found for: WBC, RBC, HGB, HCT, MCV, MCH, MCHC, RDW, PLT, MPV, GRANULOCYTES, CHINO, BANDS   Lab Results   Component Value Date/Time     12/07/2021 04:24 PM    K 4.1 12/07/2021 04:24 PM    CO2 27 12/07/2021 04:24 PM    BUN 12 12/07/2021 04:24 PM    CREATININE 0.7 12/07/2021 04:24 PM    CALCIUM 9.7 12/07/2021 04:24 PM    LABGLOM >60 12/07/2021 04:24 PM    GFRAA >60 12/07/2021 04:24 PM      Lab Results   Component Value Date/Time    TSH 2.410 12/07/2021 04:24 PM     Lab Results   Component Value Date/Time    LABA1C 6.3 09/15/2022 10:32 AM    GLUCOSE 133 12/07/2021 04:24 PM    MALBCR - 12/07/2021 04:23 PM    LABMICR <12.0 12/07/2021 04:23 PM    LABCREA 80 12/07/2021 04:23 PM     Lab Results   Component Value Date/Time    LABA1C 6.3 09/15/2022 10:32 AM    LABA1C 7.2 04/11/2022 03:21 PM    LABA1C 7.7 12/07/2021 04:07 PM     Lab Results   Component Value Date/Time    TRIG 146 12/07/2021 04:24 PM    HDL 49 12/07/2021 04:24 PM    LDLCALC 111 12/07/2021 04:24 PM    CHOL 189 12/07/2021 04:24 PM     Lab Results   Component Value Date/Time    VITD25 45 12/07/2021 04:24 PM    VITD25 30.3 12/23/2019 12:00 AM       ASSESSMENT & RECOMMENDATIONS   Lay Hsu, a 45 y.o.-old female seen in for the following issues       Assessment:      Diagnosis Orders   1. Type 2 diabetes mellitus without complication, unspecified whether long term insulin use (HCC)  POCT glycosylated hemoglobin (Hb A1C)    Comprehensive Metabolic Panel    Lipid Panel    Microalbumin / Creatinine Urine Ratio    Insulin Glargine, 2 Unit Dial, (TOUJEO MAX SOLOSTAR) 300 UNIT/ML SOPN    empagliflozin (JARDIANCE) 25 MG tablet    MD CONTINUOUS GLUCOSE MONITORING ANALYSIS I&R      2.  Vitamin D deficiency Vitamin D 25 Hydroxy      3. Class 3 severe obesity due to excess calories without serious comorbidity with body mass index (BMI) of 50.0 to 59.9 in adult ([facility]        4. Cirrhosis of liver without ascites, unspecified hepatic cirrhosis type ([facility]            Plan:     1. Type 2 diabetes mellitus without complication, unspecified whether long term insulin use (HCC)   Patient's diabetes is well controlled     Freestyle helene reviewed   plan: Continue Humalog to 25 units 3 times daily with meals plus insulin sliding scale  Continue to 48 units once per day  Continue Ozempic to 1 mg weekly  Continue Jardiance as above  The patient was advised to check blood sugars 4 times a day before meals and at bedtime   Discussed with patient A1c and blood sugar goals   Patient was counselled about the importance of self-blood glucose monitoring and eating consistent carb diet to avoid blood sugar fluctuations   Discussed lifestyle changes including diet and exercise with patient; recommended 150 minutes of moderate intensity exercise per week. 2. Vitamin D deficiency   Not taking vitamin D supplementation  Will reassess vitamin D   3. Class 3 severe obesity due to excess calories without serious comorbidity with body mass index (BMI) of 50.0 to 59.9 in adult [facility])   Discussed lifestyle changes including diet and exercise with patient in depth. Also discussed with patient cardiovascular risk associated with obesity  Following with [facility] for possible gastric bypass   4. Cirrhosis of liver without ascites, unspecified hepatic cirrhosis type ([facility]   Patient at risk for hypoglycemia. Patient should continue freestyle CGM         I personally spent greater than 30 minutes reviewing  external notes from PCP and other patient's care team providers, and personally interpreted labs associated with the above diagnosis. I also ordered labs to further assess and manage the above addressed medical conditions.      Return in about 6 months (around 3/15/2023). The above issues were reviewed with the patient who understood and agreed with the plan. Thank you for allowing us to participate in the care of this patient. Please do not hesitate to contact us with any additional questions. DAMIAN Corbin     [facility] - BEHAVIORAL HEALTH SERVICES Diabetes Care and Endocrinology   [address] [facility] [address]   Phone: [phone]  Fax: [phone]  --------------------------------------------  An electronic signature was used to authenticate this note.  DAMIAN Corbin  on 9/15/2022 at 10:50 AM

## 2022-11-10 DIAGNOSIS — E11.9 TYPE 2 DIABETES MELLITUS WITHOUT COMPLICATION, UNSPECIFIED WHETHER LONG TERM INSULIN USE (HCC): ICD-10-CM

## 2022-11-10 DIAGNOSIS — E55.9 VITAMIN D DEFICIENCY: Primary | ICD-10-CM

## 2022-11-11 RX ORDER — INSULIN LISPRO 100 [IU]/ML
INJECTION, SOLUTION INTRAVENOUS; SUBCUTANEOUS
Qty: 90 ML | Refills: 6 | Status: SHIPPED | OUTPATIENT
Start: 2022-11-11

## 2023-03-15 ENCOUNTER — OFFICE VISIT (OUTPATIENT)
Dept: ENDOCRINOLOGY | Age: 53
End: 2023-03-15
Payer: COMMERCIAL

## 2023-03-15 VITALS
BODY MASS INDEX: 53.92 KG/M2 | SYSTOLIC BLOOD PRESSURE: 114 MMHG | HEART RATE: 90 BPM | WEIGHT: 293 LBS | HEIGHT: 62 IN | RESPIRATION RATE: 16 BRPM | DIASTOLIC BLOOD PRESSURE: 76 MMHG | OXYGEN SATURATION: 98 %

## 2023-03-15 DIAGNOSIS — K74.60 CIRRHOSIS OF LIVER WITHOUT ASCITES, UNSPECIFIED HEPATIC CIRRHOSIS TYPE (HCC): ICD-10-CM

## 2023-03-15 DIAGNOSIS — E11.9 TYPE 2 DIABETES MELLITUS WITHOUT COMPLICATION, UNSPECIFIED WHETHER LONG TERM INSULIN USE (HCC): Primary | ICD-10-CM

## 2023-03-15 DIAGNOSIS — E55.9 VITAMIN D DEFICIENCY: ICD-10-CM

## 2023-03-15 DIAGNOSIS — E66.01 CLASS 3 SEVERE OBESITY DUE TO EXCESS CALORIES WITHOUT SERIOUS COMORBIDITY WITH BODY MASS INDEX (BMI) OF 50.0 TO 59.9 IN ADULT (HCC): ICD-10-CM

## 2023-03-15 DIAGNOSIS — E11.9 TYPE 2 DIABETES MELLITUS WITHOUT COMPLICATION, UNSPECIFIED WHETHER LONG TERM INSULIN USE (HCC): ICD-10-CM

## 2023-03-15 LAB
ALBUMIN SERPL-MCNC: 3.8 G/DL (ref 3.5–5.2)
ALP SERPL-CCNC: 137 U/L (ref 35–104)
ALT SERPL-CCNC: 23 U/L (ref 0–32)
ANION GAP SERPL CALCULATED.3IONS-SCNC: 13 MMOL/L (ref 7–16)
AST SERPL-CCNC: 20 U/L (ref 0–31)
BILIRUB SERPL-MCNC: 0.6 MG/DL (ref 0–1.2)
BUN SERPL-MCNC: 11 MG/DL (ref 6–20)
CALCIUM SERPL-MCNC: 9 MG/DL (ref 8.6–10.2)
CHLORIDE SERPL-SCNC: 107 MMOL/L (ref 98–107)
CHOLESTEROL, TOTAL: 130 MG/DL (ref 0–199)
CO2 SERPL-SCNC: 25 MMOL/L (ref 22–29)
CREAT SERPL-MCNC: 0.6 MG/DL (ref 0.5–1)
CREAT UR-MCNC: 62 MG/DL (ref 29–226)
GLUCOSE SERPL-MCNC: 180 MG/DL (ref 74–99)
HBA1C MFR BLD: 7 %
HDLC SERPL-MCNC: 67 MG/DL
LDLC SERPL CALC-MCNC: 43 MG/DL (ref 0–99)
MICROALBUMIN UR-MCNC: <12 MG/L
MICROALBUMIN/CREAT UR-RTO: ABNORMAL (ref 0–30)
POTASSIUM SERPL-SCNC: 4.3 MMOL/L (ref 3.5–5)
PROT SERPL-MCNC: 6.9 G/DL (ref 6.4–8.3)
SODIUM SERPL-SCNC: 145 MMOL/L (ref 132–146)
TRIGL SERPL-MCNC: 102 MG/DL (ref 0–149)
VITAMIN D 25-HYDROXY: 20 NG/ML (ref 30–100)
VLDLC SERPL CALC-MCNC: 20 MG/DL

## 2023-03-15 PROCEDURE — 83036 HEMOGLOBIN GLYCOSYLATED A1C: CPT | Performed by: CLINICAL NURSE SPECIALIST

## 2023-03-15 PROCEDURE — 95251 CONT GLUC MNTR ANALYSIS I&R: CPT | Performed by: CLINICAL NURSE SPECIALIST

## 2023-03-15 PROCEDURE — 99214 OFFICE O/P EST MOD 30 MIN: CPT | Performed by: CLINICAL NURSE SPECIALIST

## 2023-03-15 PROCEDURE — 3051F HG A1C>EQUAL 7.0%<8.0%: CPT | Performed by: CLINICAL NURSE SPECIALIST

## 2023-03-15 RX ORDER — TIRZEPATIDE 5 MG/.5ML
5 INJECTION, SOLUTION SUBCUTANEOUS WEEKLY
Qty: 12 ADJUSTABLE DOSE PRE-FILLED PEN SYRINGE | Refills: 1
Start: 2023-03-15 | End: 2023-03-15 | Stop reason: SDUPTHER

## 2023-03-15 RX ORDER — TIRZEPATIDE 2.5 MG/.5ML
INJECTION, SOLUTION SUBCUTANEOUS
Qty: 4 ADJUSTABLE DOSE PRE-FILLED PEN SYRINGE | Refills: 0 | Status: SHIPPED | OUTPATIENT
Start: 2023-03-15

## 2023-03-15 RX ORDER — TIRZEPATIDE 5 MG/.5ML
5 INJECTION, SOLUTION SUBCUTANEOUS WEEKLY
Qty: 12 ADJUSTABLE DOSE PRE-FILLED PEN SYRINGE | Refills: 1 | Status: SHIPPED | OUTPATIENT
Start: 2023-03-15

## 2023-03-15 NOTE — PROGRESS NOTES
[facility] [employer]  [facility] Department of Endocrinology Diabetes and Metabolism   [address] [place] [address]   Phone: [phone]  Fax: [phone]    Date of Service: 3/15/2023    Primary Care Physician: Trip Baez MD  Referring physician: No ref. provider found  Provider: Marin Ortiz APRN - CNS     Reason for the visit:  Type 2 DM       History of Present Illness:  The history is provided by the patient. No  was used. Accuracy of the patient data is excellent.  Allyn Cherry is a very pleasant 52 y.o. female seen today for diabetes management     Allyn Cherry was diagnosed with diabetes at age 43 and she is currently on Toujeo 48 units daily at bedtime, Humalog 22 units with meals + ss 3:50>150,  jardiance 25 mg daily  Stopped ozempic as it was causing nausea   Would like to try mounjaro     The patient has been checking blood sugar 5-8 time a days using freestyle helene  Prednisone 5 mg daily for psoriatic arthritis   Uses helene   Ambulatory continuous glucose monitoring of interstitial tissue fluid via a subcutaneous sensor for a minimum of 72 hours; analysis, interpretation and report  Average sensor glucose 222  Time in range 29  Hyperglycemia 71  Hypoglycemia 0%        Follows with [facility] for cirrhosis of liver     Most recent A1c results summarized below  Lab Results   Component Value Date/Time    LABA1C 7.0 03/15/2023 10:05 AM    LABA1C 6.3 09/15/2022 10:32 AM    LABA1C 7.2 04/11/2022 03:21 PM   Patient has had no hypoglycemic episodes   The patient has been mindful of what has been eating and following diabetic diet as encouraged  I reviewed current medications and the patient has no issues with diabetes medications  Allyn Cherry is up to date with eye exam and denied any history of diabetic retinopathy   The patient seeing podiatrist every few weeks and also performs  own feet care  Microvascular complications:  No Retinopathy, Nephropathy or Neuropathy  Macrovascular complications: no CAD, PVD, or Stroke  The patient receives Flushot every year and up to date with the Pneumonia vaccine          PAST MEDICAL HISTORY   Past Medical History:   Diagnosis Date    DM type 2 (diabetes mellitus, type 2) ([facility]     Liver cirrhosis ([facility]     Morbid obesity ([facility]     Psoriasis     Vitamin D deficiency        PAST SURGICAL HISTORY   No past surgical history on file. SOCIAL HISTORY   Tobacco:   reports that she quit smoking about 23 years ago. Her smoking use included cigarettes. She has a 5.00 pack-year smoking history. She has never used smokeless tobacco.  Alcohol:   has no history on file for alcohol use. Drugs:   has no history on file for drug use. FAMILY HISTORY   Family History   Problem Relation Age of Onset    Diabetes Neg Hx        ALLERGIES AND DRUG REACTIONS   Allergies   Allergen Reactions    Demerol Hcl [Meperidine]        CURRENT MEDICATIONS   Current Outpatient Medications   Medication Sig Dispense Refill    Tirzepatide (MOUNJARO) 2.5 MG/0.5ML SOPN SC injection Inject 2.5 mg once weekly for 4 weeks than increase to 5 mg thereafter 4 Adjustable Dose Pre-filled Pen Syringe 0    Tirzepatide (MOUNJARO) 5 MG/0.5ML SOPN SC injection Inject 0.5 mLs into the skin once a week 12 Adjustable Dose Pre-filled Pen Syringe 1    insulin lispro, 1 Unit Dial, (HUMALOG KWIKPEN) 100 UNIT/ML SOPN INJECT SUBCUTANEOUSLY 25  UNITS BEFORE MEALS SLIDING  SCALE.  UNITS DAILY (Patient taking differently: INJECT SUBCUTANEOUSLY 22  UNITS BEFORE MEALS SLIDING  SCALE.   UNITS DAILY) 90 mL 6    carvedilol (COREG) 6.25 MG tablet take 1 tablet by mouth twice a day      Insulin Glargine, 2 Unit Dial, (TOUJEO MAX SOLOSTAR) 300 UNIT/ML SOPN Inject 48 units at night 5 Adjustable Dose Pre-filled Pen Syringe 3    empagliflozin (JARDIANCE) 25 MG tablet TAKE 1 TABLET BY MOUTH  DAILY 90 tablet 3    Insulin Pen Needle (DROPLET PEN NEEDLES) 32G X 4 MM MISC USE WITH INSULIN 4 TIMES A  each 3    Continuous Blood Gluc Sensor (FREESTYLE SHANELLE 2 SENSOR) MISC Change sensor every 14 days 6 each 3    methotrexate (RHEUMATREX) 2.5 MG chemo tablet Take by mouth once a week 5 tablets every friday      Secukinumab (COSENTYX, 300 MG DOSE, SC) Inject into the skin every 30 days       meloxicam (MOBIC) 15 MG tablet Take 15 mg by mouth daily      citalopram (CELEXA) 20 MG tablet Take 20 mg by mouth daily      rOPINIRole (REQUIP) 0.5 MG tablet Take 0.5 mg by mouth 3 times daily      vitamin D (CHOLECALCIFEROL) 1000 UNIT TABS tablet Take 1,000 Units by mouth daily      IRON PO Take 45 mg by mouth daily      Semaglutide, 1 MG/DOSE, (OZEMPIC, 1 MG/DOSE,) 4 MG/3ML SOPN INJECT 1 MG WEEKLY  SUBCUTANEOUSLY (Patient not taking: Reported on 3/15/2023) 9 mL 3    predniSONE (DELTASONE) 5 MG tablet take 3 tablets by mouth once daily for 1 week then take 2 tablets. ..  (REFER TO PRESCRIPTION NOTES). (Patient not taking: Reported on 3/15/2023)       No current facility-administered medications for this visit. Review of Systems  Constitutional: No fever, no chills, no diaphoresis, no generalized weakness. HEENT: No blurred vision, No sore throat, no ear pain, no hair loss  Neck: denied any neck swelling, difficulty swallowing,   Cardio-pulmonary: No CP, SOB or palpitation, No orthopnea or PND. No cough or wheezing. GI: No N/V/D, no constipation, No abdominal pain, no melena or hematochezia   : Denied any dysuria, hematuria, flank pain, discharge, or incontinence. Skin: denied any rash, ulcer, Hirsute, or hyperpigmentation. MSK: denied any joint deformity, joint pain/swelling, muscle pain, or back pain.   Neuro: no numbness, no tingling, no weakness, _    OBJECTIVE    /76   Pulse 90   Resp 16   Ht 5' 2\" (1.575 m)   Wt (!) 304 lb (137.9 kg)   SpO2 98%   BMI 55.60 kg/m²   BP Readings from Last 4 Encounters:   03/15/23 114/76   09/15/22 (!) 114/56   04/11/22 134/72   12/07/21 (!) 142/69     [facility] Readings from Last 6 Encounters:   03/15/23 (!) 304 lb (137.9 kg)   09/15/22 292 lb (132.5 kg)   04/11/22 (!) 301 lb (136.5 kg)   12/07/21 (!) 302 lb (137 kg)   08/03/21 (!) 309 lb (140.2 kg)   01/23/20 (!) 300 lb 9.6 oz (136.4 kg)       Physical examination:  General: awake alert, oriented x3, no abnormal position or movements. HEENT: normocephalic non-traumatic, no exophthalmos   Neck: supple, no LN enlargement, no thyromegaly, no thyroid tenderness, no JVD. Pulm: Clear equal air entry no added sounds, no wheezing or rhonchi    CVS: S1 + S2, no murmur, no heave. Dorsalis pedis pulse palpable   Abd: soft lax, no tenderness, no organomegaly, audible bowel sounds. Skin: warm, no lesions, no rash.  No callus, no Ulcers, No acanthosis nigricans  Musculoskeletal: No back tenderness, no kyphosis/scoliosis    Neuro: CN intact, Monofilament sensation normal bilateral , muscle power normal  Psych: normal mood, and affect      Review of Laboratory Data:  I personally reviewed the following lab:  No results found for: WBC, RBC, HGB, HCT, MCV, MCH, MCHC, RDW, PLT, MPV, GRANULOCYTES, CHINO, NEUT, BANDS   Lab Results   Component Value Date/Time     12/07/2021 04:24 PM    K 4.1 12/07/2021 04:24 PM    CO2 27 12/07/2021 04:24 PM    BUN 12 12/07/2021 04:24 PM    CREATININE 0.7 12/07/2021 04:24 PM    CALCIUM 9.7 12/07/2021 04:24 PM    LABGLOM >60 12/07/2021 04:24 PM    GFRAA >60 12/07/2021 04:24 PM      Lab Results   Component Value Date/Time    TSH 2.410 12/07/2021 04:24 PM     Lab Results   Component Value Date/Time    LABA1C 7.0 03/15/2023 10:05 AM    GLUCOSE 133 12/07/2021 04:24 PM    MALBCR - 12/07/2021 04:23 PM    LABMICR <12.0 12/07/2021 04:23 PM    LABCREA 80 12/07/2021 04:23 PM     Lab Results   Component Value Date/Time    LABA1C 7.0 03/15/2023 10:05 AM    LABA1C 6.3 09/15/2022 10:32 AM    LABA1C 7.2 04/11/2022 03:21 PM     Lab Results   Component Value Date/Time    TRIG 146 12/07/2021 04:24 PM    HDL 49 12/07/2021 04:24 PM    LDLCALC 111 12/07/2021 04:24 PM    CHOL 189 12/07/2021 04:24 PM     Lab Results   Component Value Date/Time    VITD25 45 12/07/2021 04:24 PM    VITD25 30.3 12/23/2019 12:00 AM       ASSESSMENT & RECOMMENDATIONS   Kartik Li, a 45 y.o.-old female seen in for the following issues       Assessment:      Diagnosis Orders   1. Type 2 diabetes mellitus without complication, unspecified whether long term insulin use (HCC)  POCT glycosylated hemoglobin (Hb A1C)    Comprehensive Metabolic Panel    Lipid Panel    Microalbumin / Creatinine Urine Ratio    MA CONTINUOUS GLUCOSE MONITORING ANALYSIS I&R      2. Vitamin D deficiency  Vitamin D 25 Hydroxy      3. Class 3 severe obesity due to excess calories without serious comorbidity with body mass index (BMI) of 50.0 to 59.9 in adult ([facility] [address]        4. Cirrhosis of liver without ascites, unspecified hepatic cirrhosis type ([facility] [address]              Plan:     1. Type 2 diabetes mellitus without complication, unspecified whether long term insulin use ([facility]   Patient's diabetes not well controlled after review of recent helene. Hemoglobin A1c 7%  Patient stopped Ozempic approximately 3 to 4 months ago as she states she was having nausea  Would like to attempt [employer] reviewed   plan: Continue Humalog to 22 units 3 times daily with meals plus insulin sliding scale  Continue Toujeo to 48 units once per day  Start Mounjaro 2.5 mg once weekly for 4 weeks then increase to 5 mg thereafter  Counseled on side effects. No contraindications  Continue Jardiance 25 mg daily  Continue freestyle helene  The patient was advised to check blood sugars 4 times a day before meals and at bedtime   Discussed with patient A1c and blood sugar goals   Patient was counselled about the importance of self-blood glucose monitoring and eating consistent carb diet to avoid blood sugar fluctuations   Encourage diet and exercise     2.  Vitamin D deficiency   Not taking vitamin D supplementation  Will reassess vitamin D   3. Class 3 severe obesity due to excess calories without serious comorbidity with body mass index (BMI) of 50.0 to 59.9 in adult [facility])   Discussed lifestyle changes including diet and exercise with patient in depth. Also discussed with patient cardiovascular risk associated with obesity  Following with [facility] for possible gastric bypass  Will start GIP/GLP agonist   4. Cirrhosis of liver without ascites, unspecified hepatic cirrhosis type ([address]   Patient at risk for hypoglycemia. Patient should continue freestyle CGM         I personally spent greater than 30 minutes reviewing external notes from PCP and other patient's care team providers, and personally interpreted labs associated with the above diagnosis. I also ordered labs to further assess and manage the above addressed medical conditions. Return in about 3 months (around 6/15/2023). The above issues were reviewed with the patient who understood and agreed with the plan. Thank you for allowing us to participate in the care of this patient. Please do not hesitate to contact us with any additional questions. DAMIAN Turner [facility] Diabetes Care and Endocrinology   [address] [address]   Phone: [phone]  Fax: [phone]  --------------------------------------------  An electronic signature was used to authenticate this note.  DAMIAN Turner  on 3/15/2023 at 10:15 AM

## 2023-03-21 ENCOUNTER — TELEPHONE (OUTPATIENT)
Dept: ENDOCRINOLOGY | Age: 53
End: 2023-03-21

## 2023-03-21 NOTE — TELEPHONE ENCOUNTER
----- Message from DAMIAN Sánchez sent at 3/16/2023 10:13 AM EDT -----  Please call patient and inform her vitamin D is low.   Please have patient start vitamin D 2000 IU daily over-the-counter

## 2023-04-04 ENCOUNTER — TELEPHONE (OUTPATIENT)
Dept: ENDOCRINOLOGY | Age: 53
End: 2023-04-04

## 2023-04-04 NOTE — TELEPHONE ENCOUNTER
A prior auth was submitted for the pt's Mounjaro 5mg to [employer] through Cover[employer]eds. The pt is aware.

## 2023-04-27 ENCOUNTER — TELEPHONE (OUTPATIENT)
Dept: ENDOCRINOLOGY | Age: 53
End: 2023-04-27

## 2023-04-27 NOTE — TELEPHONE ENCOUNTER
Humalog to 22 units 3 times daily with meals plus insulin sliding scale   Toujeo to 48 units once per day  Start Mounjaro 2.5 mg once weekly for 4 weeks then increase to 5 mg thereafter  Continue Jardiance 25 mg daily      She stated she is doing very well   Emily scanned

## 2023-05-11 RX ORDER — TIRZEPATIDE 2.5 MG/.5ML
INJECTION, SOLUTION SUBCUTANEOUS
Qty: 2 ML | OUTPATIENT
Start: 2023-05-11

## 2023-05-15 RX ORDER — TIRZEPATIDE 5 MG/.5ML
5 INJECTION, SOLUTION SUBCUTANEOUS WEEKLY
Qty: 12 ADJUSTABLE DOSE PRE-FILLED PEN SYRINGE | Refills: 1 | Status: SHIPPED | OUTPATIENT
Start: 2023-05-15

## 2023-05-17 DIAGNOSIS — E11.9 TYPE 2 DIABETES MELLITUS WITHOUT COMPLICATION, UNSPECIFIED WHETHER LONG TERM INSULIN USE (HCC): ICD-10-CM

## 2023-08-14 ENCOUNTER — TELEPHONE (OUTPATIENT)
Dept: ENDOCRINOLOGY | Age: 53
End: 2023-08-14

## 2023-08-14 NOTE — TELEPHONE ENCOUNTER
What type of brain surgery did she have? Is she eating? Have her send BS log and what she's currently taking.

## 2023-08-14 NOTE — TELEPHONE ENCOUNTER
Patient called concerning the start date of  Mounjaro after brain surgery . Surgery was on the 8/8 according to patient . Patient would like to know the day that are allowed to restart medication back up?

## 2023-08-17 NOTE — TELEPHONE ENCOUNTER
The pt had a meningioma of her brain removed. She has her normal appetite back I did confirm that she's on:    Humalog to 22/22/22 +3:50>150  Toujeo to 48 units once per day  Jardiance 25 mg daily     Attached is her [place].

## 2023-08-21 NOTE — TELEPHONE ENCOUNTER
Yes, she can resume Mounjaro at previous dose. I recommend decreasing Toujeo to 42 units as she has some early am hypoglycemia. Advise her to call if hypoglycemia continues.

## 2023-09-22 DIAGNOSIS — E11.9 TYPE 2 DIABETES MELLITUS WITHOUT COMPLICATION, UNSPECIFIED WHETHER LONG TERM INSULIN USE (HCC): ICD-10-CM

## 2023-10-13 DIAGNOSIS — E11.9 TYPE 2 DIABETES MELLITUS WITHOUT COMPLICATION, UNSPECIFIED WHETHER LONG TERM INSULIN USE (HCC): ICD-10-CM

## 2023-10-15 DIAGNOSIS — E11.9 TYPE 2 DIABETES MELLITUS WITHOUT COMPLICATION, UNSPECIFIED WHETHER LONG TERM INSULIN USE (HCC): ICD-10-CM

## 2023-10-24 RX ORDER — INSULIN GLARGINE 300 U/ML
INJECTION, SOLUTION SUBCUTANEOUS
Qty: 18 ML | Refills: 3 | Status: SHIPPED | OUTPATIENT
Start: 2023-10-24

## 2023-10-24 RX ORDER — INSULIN LISPRO 100 [IU]/ML
INJECTION, SOLUTION INTRAVENOUS; SUBCUTANEOUS
Qty: 90 ML | Refills: 3 | Status: SHIPPED | OUTPATIENT
Start: 2023-10-24

## 2023-11-01 RX ORDER — TIRZEPATIDE 5 MG/.5ML
INJECTION, SOLUTION SUBCUTANEOUS
Qty: 6 ML | Refills: 3 | Status: SHIPPED | OUTPATIENT
Start: 2023-11-01

## 2023-11-20 DIAGNOSIS — E11.9 TYPE 2 DIABETES MELLITUS WITHOUT COMPLICATION, UNSPECIFIED WHETHER LONG TERM INSULIN USE (HCC): Primary | ICD-10-CM

## 2023-11-21 ENCOUNTER — OFFICE VISIT (OUTPATIENT)
Dept: ENDOCRINOLOGY | Age: 53
End: 2023-11-21

## 2023-11-21 VITALS
DIASTOLIC BLOOD PRESSURE: 74 MMHG | HEIGHT: 62 IN | OXYGEN SATURATION: 98 % | WEIGHT: 293 LBS | SYSTOLIC BLOOD PRESSURE: 144 MMHG | RESPIRATION RATE: 18 BRPM | BODY MASS INDEX: 53.92 KG/M2 | HEART RATE: 77 BPM

## 2023-11-21 DIAGNOSIS — E11.9 TYPE 2 DIABETES MELLITUS WITHOUT COMPLICATION, UNSPECIFIED WHETHER LONG TERM INSULIN USE (HCC): ICD-10-CM

## 2023-11-21 RX ORDER — PEN NEEDLE, DIABETIC 32GX 5/32"
NEEDLE, DISPOSABLE MISCELLANEOUS
Qty: 300 EACH | Refills: 1 | Status: SHIPPED
Start: 2023-11-21 | End: 2023-11-21 | Stop reason: SDUPTHER

## 2023-11-21 RX ORDER — TIRZEPATIDE 7.5 MG/.5ML
7.5 INJECTION, SOLUTION SUBCUTANEOUS WEEKLY
Qty: 12 ADJUSTABLE DOSE PRE-FILLED PEN SYRINGE | Refills: 3 | Status: SHIPPED | OUTPATIENT
Start: 2023-11-21

## 2023-11-21 RX ORDER — INSULIN GLARGINE 300 U/ML
INJECTION, SOLUTION SUBCUTANEOUS
Qty: 36 ML | Refills: 3 | Status: SHIPPED | OUTPATIENT
Start: 2023-11-21

## 2023-11-21 RX ORDER — INSULIN LISPRO 100 [IU]/ML
INJECTION, SOLUTION INTRAVENOUS; SUBCUTANEOUS
Qty: 90 ML | Refills: 3 | Status: SHIPPED | OUTPATIENT
Start: 2023-11-21

## 2023-11-21 RX ORDER — PEN NEEDLE, DIABETIC 32GX 5/32"
NEEDLE, DISPOSABLE MISCELLANEOUS
Qty: 300 EACH | Refills: 5 | Status: SHIPPED | OUTPATIENT
Start: 2023-11-21

## 2023-11-21 NOTE — PROGRESS NOTES
affect      Review of Laboratory Data:  I personally reviewed the following lab:  No results found for: \"WBC\", \"RBC\", \"HGB\", \"HCT\", \"MCV\", \"MCH\", \"MCHC\", \"RDW\", \"PLT\", \"MPV\", \"GRANULOCYTES\", \"CHINO\", \"NEUT\", \"BANDS\"   Lab Results   Component Value Date/Time     03/15/2023 10:39 AM    K 4.3 03/15/2023 10:39 AM    CO2 25 03/15/2023 10:39 AM    BUN 11 03/15/2023 10:39 AM    CREATININE 0.6 03/15/2023 10:39 AM    CALCIUM 9.0 03/15/2023 10:39 AM    LABGLOM >60 03/15/2023 10:39 AM    GFRAA >60 12/07/2021 04:24 PM      Lab Results   Component Value Date/Time    TSH 2.410 12/07/2021 04:24 PM     Lab Results   Component Value Date/Time    LABA1C 7.0 03/15/2023 10:05 AM    GLUCOSE 180 03/15/2023 10:39 AM    MALBCR - 03/15/2023 10:38 AM    LABCREA 62 03/15/2023 10:38 AM     Lab Results   Component Value Date/Time    LABA1C 7.0 03/15/2023 10:05 AM    LABA1C 6.3 09/15/2022 10:32 AM    LABA1C 7.2 04/11/2022 03:21 PM     Lab Results   Component Value Date/Time    TRIG 102 03/15/2023 10:39 AM    HDL 67 03/15/2023 10:39 AM    LDLCALC 43 03/15/2023 10:39 AM    CHOL 130 03/15/2023 10:39 AM     Lab Results   Component Value Date/Time    VITD25 20 03/15/2023 10:39 AM    VITD25 45 12/07/2021 04:24 PM       ASSESSMENT & RECOMMENDATIONS   Valentina Villalpando, a 52 y.o.-old female seen in for the following issues       Assessment:      Diagnosis Orders   1. Type 2 diabetes mellitus without complication, unspecified whether long term insulin use (HCC)  insulin lispro, 1 Unit Dial, (HUMALOG KWIKPEN) 100 UNIT/ML SOPN    Insulin Glargine, 2 Unit Dial, (TOUJEO MAX SOLOSTAR) 300 UNIT/ML SOPN    Insulin Pen Needle (DROPLET PEN NEEDLES) 32G X 4 MM MISC              Plan:     1. Type 2 diabetes mellitus without complication, unspecified whether long term insulin use ([address])   Patient's diabetes well controlled. Hemoglobin A1c 6.4%- seen on recent labs from patient s phone.   Increase Mounjaro 7.5 mg weekly  Decrease Humalog to 18 units 3 times daily with

## 2024-03-21 ENCOUNTER — OFFICE VISIT (OUTPATIENT)
Dept: ENDOCRINOLOGY | Age: 54
End: 2024-03-21
Payer: COMMERCIAL

## 2024-03-21 VITALS
HEART RATE: 78 BPM | HEIGHT: 62 IN | SYSTOLIC BLOOD PRESSURE: 110 MMHG | RESPIRATION RATE: 18 BRPM | WEIGHT: 293 LBS | BODY MASS INDEX: 53.92 KG/M2 | OXYGEN SATURATION: 74 % | DIASTOLIC BLOOD PRESSURE: 61 MMHG

## 2024-03-21 DIAGNOSIS — E66.01 CLASS 3 SEVERE OBESITY DUE TO EXCESS CALORIES WITHOUT SERIOUS COMORBIDITY WITH BODY MASS INDEX (BMI) OF 50.0 TO 59.9 IN ADULT (HCC): ICD-10-CM

## 2024-03-21 DIAGNOSIS — E11.9 TYPE 2 DIABETES MELLITUS WITHOUT COMPLICATION, UNSPECIFIED WHETHER LONG TERM INSULIN USE (HCC): Primary | ICD-10-CM

## 2024-03-21 DIAGNOSIS — E55.9 VITAMIN D DEFICIENCY: ICD-10-CM

## 2024-03-21 DIAGNOSIS — K74.60 CIRRHOSIS OF LIVER WITHOUT ASCITES, UNSPECIFIED HEPATIC CIRRHOSIS TYPE (HCC): ICD-10-CM

## 2024-03-21 LAB — HBA1C MFR BLD: 5.6 %

## 2024-03-21 PROCEDURE — 95251 CONT GLUC MNTR ANALYSIS I&R: CPT | Performed by: FAMILY MEDICINE

## 2024-03-21 PROCEDURE — 99214 OFFICE O/P EST MOD 30 MIN: CPT | Performed by: FAMILY MEDICINE

## 2024-03-21 PROCEDURE — 3044F HG A1C LEVEL LT 7.0%: CPT | Performed by: FAMILY MEDICINE

## 2024-03-21 PROCEDURE — 83036 HEMOGLOBIN GLYCOSYLATED A1C: CPT | Performed by: FAMILY MEDICINE

## 2024-03-21 RX ORDER — TIRZEPATIDE 10 MG/.5ML
10 INJECTION, SOLUTION SUBCUTANEOUS WEEKLY
Qty: 12 ADJUSTABLE DOSE PRE-FILLED PEN SYRINGE | Refills: 3 | Status: SHIPPED | OUTPATIENT
Start: 2024-03-21

## 2024-03-21 NOTE — PROGRESS NOTES
exophthalmos   Neck: supple, no LN enlargement, no thyromegaly, no thyroid tenderness, no JVD.  Pulm: Clear equal air entry no added sounds, no wheezing or rhonchi    CVS: S1 + S2, no murmur, no heave. Dorsalis pedis pulse palpable   Abd: soft lax, no tenderness, no organomegaly, audible bowel sounds.   Skin: warm, no lesions, no rash. No callus, no Ulcers, No acanthosis nigricans  Musculoskeletal: No back tenderness, no kyphosis/scoliosis    Neuro: CN intact, sensation normal bilateral , muscle power normal  Psych: normal mood, and affect      Review of Laboratory Data:  I personally reviewed the following lab:  No results found for: \"WBC\", \"RBC\", \"HGB\", \"HCT\", \"MCV\", \"MCH\", \"MCHC\", \"RDW\", \"PLT\", \"MPV\", \"GRANULOCYTES\", \"CHINO\", \"NEUT\", \"BANDS\"   Lab Results   Component Value Date/Time     03/15/2023 10:39 AM    K 4.3 03/15/2023 10:39 AM    CO2 25 03/15/2023 10:39 AM    BUN 11 03/15/2023 10:39 AM    CREATININE 0.6 03/15/2023 10:39 AM    CALCIUM 9.0 03/15/2023 10:39 AM    LABGLOM >60 03/15/2023 10:39 AM    GFRAA >60 12/07/2021 04:24 PM      Lab Results   Component Value Date/Time    TSH 2.410 12/07/2021 04:24 PM     Lab Results   Component Value Date/Time    LABA1C 5.6 03/21/2024 03:05 PM    GLUCOSE 180 03/15/2023 10:39 AM    MALBCR - 03/15/2023 10:38 AM    LABCREA 62 03/15/2023 10:38 AM     Lab Results   Component Value Date/Time    LABA1C 5.6 03/21/2024 03:05 PM    LABA1C 7.0 03/15/2023 10:05 AM    LABA1C 6.3 09/15/2022 10:32 AM     Lab Results   Component Value Date/Time    TRIG 102 03/15/2023 10:39 AM    HDL 67 03/15/2023 10:39 AM    LDLCALC 43 03/15/2023 10:39 AM    CHOL 130 03/15/2023 10:39 AM     Lab Results   Component Value Date/Time    VITD25 20 03/15/2023 10:39 AM    VITD25 45 12/07/2021 04:24 PM       ASSESSMENT & RECOMMENDATIONS   Allyn Cherry, a 53 y.o.-old female seen in for the following issues       Assessment:      Diagnosis Orders   1. Type 2 diabetes mellitus without complication, unspecified

## 2024-05-15 RX ORDER — TIRZEPATIDE 10 MG/.5ML
10 INJECTION, SOLUTION SUBCUTANEOUS WEEKLY
Qty: 12 ADJUSTABLE DOSE PRE-FILLED PEN SYRINGE | Refills: 3 | Status: SHIPPED | OUTPATIENT
Start: 2024-05-15

## 2024-07-22 ENCOUNTER — OFFICE VISIT (OUTPATIENT)
Dept: ENDOCRINOLOGY | Age: 54
End: 2024-07-22
Payer: COMMERCIAL

## 2024-07-22 VITALS
RESPIRATION RATE: 18 BRPM | HEIGHT: 62 IN | WEIGHT: 292 LBS | SYSTOLIC BLOOD PRESSURE: 115 MMHG | OXYGEN SATURATION: 93 % | DIASTOLIC BLOOD PRESSURE: 70 MMHG | HEART RATE: 85 BPM | BODY MASS INDEX: 53.73 KG/M2

## 2024-07-22 DIAGNOSIS — E66.01 CLASS 3 SEVERE OBESITY DUE TO EXCESS CALORIES WITHOUT SERIOUS COMORBIDITY WITH BODY MASS INDEX (BMI) OF 50.0 TO 59.9 IN ADULT (HCC): ICD-10-CM

## 2024-07-22 DIAGNOSIS — E55.9 VITAMIN D DEFICIENCY: ICD-10-CM

## 2024-07-22 DIAGNOSIS — E11.9 TYPE 2 DIABETES MELLITUS WITHOUT COMPLICATION, UNSPECIFIED WHETHER LONG TERM INSULIN USE (HCC): Primary | ICD-10-CM

## 2024-07-22 DIAGNOSIS — K74.60 CIRRHOSIS OF LIVER WITHOUT ASCITES, UNSPECIFIED HEPATIC CIRRHOSIS TYPE (HCC): ICD-10-CM

## 2024-07-22 LAB — HBA1C MFR BLD: 5 %

## 2024-07-22 PROCEDURE — 83036 HEMOGLOBIN GLYCOSYLATED A1C: CPT | Performed by: FAMILY MEDICINE

## 2024-07-22 PROCEDURE — 99214 OFFICE O/P EST MOD 30 MIN: CPT | Performed by: FAMILY MEDICINE

## 2024-07-22 PROCEDURE — 95251 CONT GLUC MNTR ANALYSIS I&R: CPT | Performed by: FAMILY MEDICINE

## 2024-07-22 PROCEDURE — 3044F HG A1C LEVEL LT 7.0%: CPT | Performed by: FAMILY MEDICINE

## 2024-07-22 RX ORDER — TIRZEPATIDE 12.5 MG/.5ML
12.5 INJECTION, SOLUTION SUBCUTANEOUS WEEKLY
Qty: 12 ADJUSTABLE DOSE PRE-FILLED PEN SYRINGE | Refills: 3 | Status: SHIPPED | OUTPATIENT
Start: 2024-07-22

## 2024-07-22 RX ORDER — BLOOD-GLUCOSE SENSOR
EACH MISCELLANEOUS
Qty: 6 EACH | Refills: 3 | Status: SHIPPED | OUTPATIENT
Start: 2024-07-22

## 2024-07-22 NOTE — PROGRESS NOTES
[facility] [place]  [facility] Department of Endocrinology Diabetes and Metabolism   [address] [place] [address]    Phone: [phone]  Fax: [phone]    Date of Service: 7/22/2024    Primary Care Physician: Trip Baez MD  Referring physician: No ref. provider found  Provider: DAMIAN Landa CNP     Reason for the visit:  Type 2 DM       History of Present Illness:  The history is provided by the patient. No  was used. Accuracy of the patient data is excellent.  Allyn Cherry is a very pleasant 54 y.o. female seen today for diabetes management     Allyn Cherry was diagnosed with diabetes at age 43 and she is currently ordered jardiance 25 mg daily and Mounjaro 10 mg weekly    Stopped all insulin at last visit     stopped ozempic as it was causing nausea     On prednisone 5 mg daily for psoriatic arthritis (recently decreased.  Trying to wean off)    The patient has been checking blood sugar using freestyle helene, but only checking sporadically.    Uses helene   TIR 80%  Hyperglycemia 20%  Hypoglycemia 0%  Avg glucose 148        Follows with [facility] for cirrhosis of liver     Most recent A1c results summarized below  Lab Results   Component Value Date/Time    LABA1C 5.0 07/22/2024 03:30 PM    LABA1C 5.6 03/21/2024 03:05 PM    LABA1C 7.0 03/15/2023 10:05 AM     Patient has had infrequent hypoglycemic episodes  The patient has been mindful of what has been eating and following diabetic diet as encouraged  I reviewed current medications and the patient has no issues with diabetes medications  Allyn Cherry is up to date with eye exam   The patient performs  own feet care  Microvascular complications:  No Retinopathy, Nephropathy or Neuropathy   Macrovascular complications: no CAD, PVD, or Stroke  The patient receives Flushot every year          PAST MEDICAL HISTORY   Past Medical History:   Diagnosis Date    DM type 2 (diabetes mellitus, type 2) (HCC)     Liver

## 2024-08-05 DIAGNOSIS — E11.9 TYPE 2 DIABETES MELLITUS WITHOUT COMPLICATION, UNSPECIFIED WHETHER LONG TERM INSULIN USE (HCC): Primary | ICD-10-CM

## 2024-08-05 RX ORDER — ACYCLOVIR 400 MG/1
1 TABLET ORAL
Qty: 9 EACH | Refills: 3 | Status: SHIPPED | OUTPATIENT
Start: 2024-08-05

## 2024-10-22 RX ORDER — EMPAGLIFLOZIN 25 MG/1
25 TABLET, FILM COATED ORAL DAILY
Qty: 90 TABLET | Refills: 1 | Status: SHIPPED | OUTPATIENT
Start: 2024-10-22

## 2024-10-28 DIAGNOSIS — E11.9 TYPE 2 DIABETES MELLITUS WITHOUT COMPLICATION, UNSPECIFIED WHETHER LONG TERM INSULIN USE (HCC): Primary | ICD-10-CM

## 2024-10-28 RX ORDER — TIRZEPATIDE 10 MG/.5ML
10 INJECTION, SOLUTION SUBCUTANEOUS WEEKLY
Qty: 6 ML | Refills: 3 | Status: SHIPPED | OUTPATIENT
Start: 2024-10-28

## 2024-11-19 ENCOUNTER — OFFICE VISIT (OUTPATIENT)
Dept: ENDOCRINOLOGY | Age: 54
End: 2024-11-19
Payer: COMMERCIAL

## 2024-11-19 VITALS
TEMPERATURE: 97.8 F | OXYGEN SATURATION: 100 % | BODY MASS INDEX: 52.33 KG/M2 | RESPIRATION RATE: 18 BRPM | SYSTOLIC BLOOD PRESSURE: 125 MMHG | DIASTOLIC BLOOD PRESSURE: 74 MMHG | HEIGHT: 62 IN | HEART RATE: 75 BPM | WEIGHT: 284.4 LBS

## 2024-11-19 DIAGNOSIS — E11.9 TYPE 2 DIABETES MELLITUS WITHOUT COMPLICATION, WITHOUT LONG-TERM CURRENT USE OF INSULIN (HCC): Primary | ICD-10-CM

## 2024-11-19 DIAGNOSIS — K74.60 CIRRHOSIS OF LIVER WITHOUT ASCITES, UNSPECIFIED HEPATIC CIRRHOSIS TYPE (HCC): ICD-10-CM

## 2024-11-19 DIAGNOSIS — E66.01 CLASS 3 SEVERE OBESITY DUE TO EXCESS CALORIES WITHOUT SERIOUS COMORBIDITY WITH BODY MASS INDEX (BMI) OF 50.0 TO 59.9 IN ADULT: ICD-10-CM

## 2024-11-19 DIAGNOSIS — E66.813 CLASS 3 SEVERE OBESITY DUE TO EXCESS CALORIES WITHOUT SERIOUS COMORBIDITY WITH BODY MASS INDEX (BMI) OF 50.0 TO 59.9 IN ADULT: ICD-10-CM

## 2024-11-19 DIAGNOSIS — E55.9 VITAMIN D DEFICIENCY: ICD-10-CM

## 2024-11-19 LAB — HBA1C MFR BLD: 5.2 %

## 2024-11-19 PROCEDURE — 3044F HG A1C LEVEL LT 7.0%: CPT | Performed by: FAMILY MEDICINE

## 2024-11-19 PROCEDURE — 95251 CONT GLUC MNTR ANALYSIS I&R: CPT | Performed by: FAMILY MEDICINE

## 2024-11-19 PROCEDURE — 83036 HEMOGLOBIN GLYCOSYLATED A1C: CPT | Performed by: FAMILY MEDICINE

## 2024-11-19 PROCEDURE — 99214 OFFICE O/P EST MOD 30 MIN: CPT | Performed by: FAMILY MEDICINE

## 2024-11-19 NOTE — PROGRESS NOTES
spent greater than 30 minutes reviewing external notes from PCP and other patient's care team providers, and personally interpreted labs associated with the above diagnosis. I also ordered labs to further assess and manage the above addressed medical conditions.     Return in about 4 months (around 3/19/2025) for Type 2 DM.    The above issues were reviewed with the patient who understood and agreed with the plan.    Thank you for allowing us to participate in the care of this patient. Please do not hesitate to contact us with any additional questions.     DAMIAN Landa CNP      [facility]   [address] [place] [address].  [address]  [place] [address]  Phone: [phone]  Fax: [phone]   --------------------------------------------  An electronic signature was used to authenticate this note. DAMIAN Landa CNP  on 11/19/2024 at 4:26 PM

## 2025-03-31 ENCOUNTER — OFFICE VISIT (OUTPATIENT)
Dept: ENDOCRINOLOGY | Age: 55
End: 2025-03-31
Payer: COMMERCIAL

## 2025-03-31 VITALS
DIASTOLIC BLOOD PRESSURE: 74 MMHG | WEIGHT: 281 LBS | HEART RATE: 77 BPM | HEIGHT: 62 IN | SYSTOLIC BLOOD PRESSURE: 120 MMHG | OXYGEN SATURATION: 98 % | BODY MASS INDEX: 51.71 KG/M2

## 2025-03-31 DIAGNOSIS — K74.60 CIRRHOSIS OF LIVER WITHOUT ASCITES, UNSPECIFIED HEPATIC CIRRHOSIS TYPE (HCC): ICD-10-CM

## 2025-03-31 DIAGNOSIS — E11.9 TYPE 2 DIABETES MELLITUS WITHOUT COMPLICATION, WITHOUT LONG-TERM CURRENT USE OF INSULIN: Primary | ICD-10-CM

## 2025-03-31 DIAGNOSIS — E66.01 CLASS 3 SEVERE OBESITY DUE TO EXCESS CALORIES WITHOUT SERIOUS COMORBIDITY WITH BODY MASS INDEX (BMI) OF 50.0 TO 59.9 IN ADULT: ICD-10-CM

## 2025-03-31 DIAGNOSIS — E66.813 CLASS 3 SEVERE OBESITY DUE TO EXCESS CALORIES WITHOUT SERIOUS COMORBIDITY WITH BODY MASS INDEX (BMI) OF 50.0 TO 59.9 IN ADULT: ICD-10-CM

## 2025-03-31 DIAGNOSIS — E55.9 VITAMIN D DEFICIENCY: ICD-10-CM

## 2025-03-31 LAB — HBA1C MFR BLD: 5.1 %

## 2025-03-31 PROCEDURE — 83036 HEMOGLOBIN GLYCOSYLATED A1C: CPT | Performed by: CLINICAL NURSE SPECIALIST

## 2025-03-31 PROCEDURE — 99214 OFFICE O/P EST MOD 30 MIN: CPT | Performed by: CLINICAL NURSE SPECIALIST

## 2025-03-31 PROCEDURE — 3044F HG A1C LEVEL LT 7.0%: CPT | Performed by: CLINICAL NURSE SPECIALIST

## 2025-03-31 PROCEDURE — 95251 CONT GLUC MNTR ANALYSIS I&R: CPT | Performed by: CLINICAL NURSE SPECIALIST

## 2025-03-31 RX ORDER — ASPIRIN 81 MG/1
81 TABLET ORAL DAILY
COMMUNITY

## 2025-03-31 RX ORDER — FEXOFENADINE HCL 180 MG/1
180 TABLET ORAL DAILY
COMMUNITY

## 2025-03-31 RX ORDER — ATORVASTATIN CALCIUM 40 MG/1
40 TABLET, FILM COATED ORAL DAILY
COMMUNITY

## 2025-03-31 RX ORDER — TOFACITINIB 11 MG/1
TABLET, FILM COATED, EXTENDED RELEASE ORAL
COMMUNITY

## 2025-03-31 RX ORDER — MULTIVIT WITH MINERALS/LUTEIN
400 TABLET ORAL DAILY
COMMUNITY

## 2025-03-31 NOTE — PROGRESS NOTES
PO Take 45 mg by mouth daily      Continuous Glucose Sensor (DEXCOM G7 SENSOR) MISC 1 each by Does not apply route every 10 days (Patient not taking: Reported on 3/31/2025) 9 each 3    predniSONE (DELTASONE) 5 MG tablet  (Patient not taking: Reported on 3/31/2025)      Secukinumab (COSENTYX, 300 MG DOSE, SC) Inject into the skin every 30 days  (Patient not taking: Reported on 3/31/2025)      citalopram (CELEXA) 20 MG tablet Take 1 tablet by mouth daily (Patient not taking: Reported on 3/31/2025)       No current facility-administered medications for this visit.       Review of Systems  Constitutional: No fever, no chills, no diaphoresis, no generalized weakness.  HEENT: No blurred vision, No sore throat, no ear pain, no hair loss  Neck: denied any neck swelling, difficulty swallowing,   Cardio-pulmonary: No CP, SOB or palpitation, No orthopnea or PND. No cough or wheezing.  GI: No N/V/D, no constipation, No abdominal pain, no melena or hematochezia   : Denied any dysuria, hematuria, flank pain, discharge, or incontinence.   Skin: denied any rash, ulcer, Hirsute, or hyperpigmentation.   MSK: denied any joint deformity, joint pain/swelling, muscle pain, or back pain.  Neuro: no numbness, no tingling, no weakness    OBJECTIVE    /74   Pulse 77   Ht 1.575 m (5' 2\")   Wt 127.5 kg (281 lb)   SpO2 98%   BMI 51.40 kg/m²   BP Readings from Last 4 Encounters:   03/31/25 120/74   11/19/24 125/74   07/22/24 115/70   03/21/24 110/61     Wt Readings from Last 6 Encounters:   03/31/25 127.5 kg (281 lb)   11/19/24 129 kg (284 lb 6.4 oz)   07/22/24 132.5 kg (292 lb)   03/21/24 (!) 139.8 kg (308 lb 3.2 oz)   11/21/23 (!) 137.6 kg (303 lb 6.4 oz)   03/15/23 (!) 137.9 kg (304 lb)       Physical examination:  General: awake alert, oriented x3, no abnormal position or movements.  HEENT: normocephalic non-traumatic, no exophthalmos   Neck: supple, no LN enlargement, no thyromegaly, no thyroid tenderness, no JVD.  Pulm: Clear

## 2025-06-04 DIAGNOSIS — E11.9 TYPE 2 DIABETES MELLITUS WITHOUT COMPLICATION, UNSPECIFIED WHETHER LONG TERM INSULIN USE (HCC): ICD-10-CM

## 2025-07-01 RX ORDER — ACYCLOVIR 400 MG/1
TABLET ORAL
Qty: 9 EACH | Refills: 3 | OUTPATIENT
Start: 2025-07-01

## 2025-08-11 DIAGNOSIS — E11.9 TYPE 2 DIABETES MELLITUS WITHOUT COMPLICATION, WITHOUT LONG-TERM CURRENT USE OF INSULIN (HCC): ICD-10-CM

## 2025-08-12 RX ORDER — TIRZEPATIDE 12.5 MG/.5ML
INJECTION, SOLUTION SUBCUTANEOUS
Qty: 6 ML | Refills: 1 | Status: SHIPPED | OUTPATIENT
Start: 2025-08-12

## 2025-08-18 DIAGNOSIS — E11.9 TYPE 2 DIABETES MELLITUS WITHOUT COMPLICATION, UNSPECIFIED WHETHER LONG TERM INSULIN USE (HCC): ICD-10-CM

## 2025-08-18 RX ORDER — ACYCLOVIR 400 MG/1
1 TABLET ORAL
Qty: 9 EACH | Refills: 3 | Status: SHIPPED | OUTPATIENT
Start: 2025-08-18